# Patient Record
Sex: FEMALE | Race: WHITE | NOT HISPANIC OR LATINO | Employment: FULL TIME | URBAN - METROPOLITAN AREA
[De-identification: names, ages, dates, MRNs, and addresses within clinical notes are randomized per-mention and may not be internally consistent; named-entity substitution may affect disease eponyms.]

---

## 2023-03-10 PROBLEM — E55.9 VITAMIN D DEFICIENCY DISEASE: Status: ACTIVE | Noted: 2023-03-10

## 2023-03-10 PROBLEM — M25.552 HIP PAIN, ACUTE, LEFT: Status: ACTIVE | Noted: 2023-03-10

## 2023-03-10 PROBLEM — M77.51 TENDINITIS OF RIGHT FOOT: Status: ACTIVE | Noted: 2023-03-10

## 2023-03-10 PROBLEM — M46.1 SACROILIITIS (CMS-HCC): Status: ACTIVE | Noted: 2023-03-10

## 2023-03-10 PROBLEM — R73.09 ELEVATED GLUCOSE: Status: ACTIVE | Noted: 2023-03-10

## 2023-03-10 PROBLEM — M54.50 ACUTE LEFT-SIDED LOW BACK PAIN WITHOUT SCIATICA: Status: ACTIVE | Noted: 2023-03-10

## 2023-03-10 PROBLEM — F41.8 ANXIETY ASSOCIATED WITH DEPRESSION: Status: ACTIVE | Noted: 2023-03-10

## 2023-03-10 PROBLEM — E66.01 MORBID OBESITY (MULTI): Status: ACTIVE | Noted: 2023-03-10

## 2023-03-10 PROBLEM — J45.41 MODERATE PERSISTENT ASTHMA WITH ACUTE EXACERBATION (HHS-HCC): Status: ACTIVE | Noted: 2023-03-10

## 2023-03-10 PROBLEM — G43.909 MIGRAINE HEADACHE: Status: ACTIVE | Noted: 2023-03-10

## 2023-03-10 PROBLEM — H69.93 DYSFUNCTION OF BOTH EUSTACHIAN TUBES: Status: ACTIVE | Noted: 2023-03-10

## 2023-03-10 PROBLEM — D64.9 ANEMIA: Status: ACTIVE | Noted: 2023-03-10

## 2023-03-10 PROBLEM — G47.00 INSOMNIA: Status: ACTIVE | Noted: 2023-03-10

## 2023-03-10 PROBLEM — M94.0 COSTOCHONDRITIS: Status: ACTIVE | Noted: 2023-03-10

## 2023-03-10 PROBLEM — J45.909 ALLERGIC ASTHMA (HHS-HCC): Status: ACTIVE | Noted: 2023-03-10

## 2023-03-10 PROBLEM — G56.01 CARPAL TUNNEL SYNDROME OF RIGHT WRIST: Status: ACTIVE | Noted: 2023-03-10

## 2023-03-10 PROBLEM — M77.8 TENDINITIS OF LEFT HAND: Status: ACTIVE | Noted: 2023-03-10

## 2023-03-10 PROBLEM — K21.9 GERD (GASTROESOPHAGEAL REFLUX DISEASE): Status: ACTIVE | Noted: 2023-03-10

## 2023-03-10 PROBLEM — R73.03 PREDIABETES: Status: ACTIVE | Noted: 2023-03-10

## 2023-03-10 RX ORDER — BUPROPION HYDROCHLORIDE 300 MG/1
1 TABLET ORAL DAILY
COMMUNITY
Start: 2022-02-18 | End: 2023-03-17 | Stop reason: SDUPTHER

## 2023-03-10 RX ORDER — FLUTICASONE PROPIONATE 50 MCG
2 SPRAY, SUSPENSION (ML) NASAL DAILY
COMMUNITY
End: 2023-03-17 | Stop reason: ALTCHOICE

## 2023-03-10 RX ORDER — UBIDECARENONE 75 MG
500 CAPSULE ORAL DAILY
COMMUNITY
End: 2023-03-17 | Stop reason: ALTCHOICE

## 2023-03-10 RX ORDER — TRAZODONE HYDROCHLORIDE 50 MG/1
50 TABLET ORAL NIGHTLY PRN
Qty: 14 TABLET | Refills: 0 | OUTPATIENT
Start: 2023-03-10 | End: 2023-03-24

## 2023-03-10 RX ORDER — ALBUTEROL SULFATE 90 UG/1
2 AEROSOL, METERED RESPIRATORY (INHALATION) EVERY 4 HOURS PRN
COMMUNITY
End: 2023-12-27

## 2023-03-10 RX ORDER — CITALOPRAM 10 MG/1
30 TABLET ORAL DAILY
COMMUNITY
Start: 2017-03-27 | End: 2023-03-17 | Stop reason: ALTCHOICE

## 2023-03-10 RX ORDER — TRIAMCINOLONE ACETONIDE 1 MG/G
1 CREAM TOPICAL 2 TIMES DAILY
COMMUNITY
Start: 2022-02-18

## 2023-03-10 RX ORDER — CELECOXIB 200 MG/1
200 CAPSULE ORAL 2 TIMES DAILY
COMMUNITY
Start: 2022-02-18 | End: 2023-03-17 | Stop reason: SDUPTHER

## 2023-03-10 RX ORDER — FLUTICASONE FUROATE AND VILANTEROL 100; 25 UG/1; UG/1
1 POWDER RESPIRATORY (INHALATION) DAILY
COMMUNITY
End: 2023-03-17 | Stop reason: ALTCHOICE

## 2023-03-10 RX ORDER — TRAZODONE HYDROCHLORIDE 50 MG/1
1 TABLET ORAL NIGHTLY PRN
COMMUNITY
Start: 2022-08-26 | End: 2023-03-13 | Stop reason: SDUPTHER

## 2023-03-10 RX ORDER — OMEPRAZOLE 20 MG/1
1 CAPSULE, DELAYED RELEASE ORAL DAILY
COMMUNITY
Start: 2020-06-12 | End: 2023-03-17 | Stop reason: SDUPTHER

## 2023-03-10 RX ORDER — MIRTAZAPINE 30 MG/1
1 TABLET, FILM COATED ORAL NIGHTLY
COMMUNITY
End: 2023-03-17 | Stop reason: ALTCHOICE

## 2023-03-13 DIAGNOSIS — G47.09 OTHER INSOMNIA: Primary | ICD-10-CM

## 2023-03-13 RX ORDER — TRAZODONE HYDROCHLORIDE 50 MG/1
50 TABLET ORAL NIGHTLY PRN
Qty: 30 TABLET | Refills: 0 | Status: SHIPPED | OUTPATIENT
Start: 2023-03-13 | End: 2023-03-17 | Stop reason: SDUPTHER

## 2023-03-17 ENCOUNTER — OFFICE VISIT (OUTPATIENT)
Dept: PRIMARY CARE | Facility: CLINIC | Age: 53
End: 2023-03-17
Payer: COMMERCIAL

## 2023-03-17 VITALS
SYSTOLIC BLOOD PRESSURE: 124 MMHG | DIASTOLIC BLOOD PRESSURE: 84 MMHG | HEIGHT: 68 IN | HEART RATE: 85 BPM | OXYGEN SATURATION: 98 % | WEIGHT: 268 LBS | BODY MASS INDEX: 40.62 KG/M2 | TEMPERATURE: 97.3 F

## 2023-03-17 DIAGNOSIS — Z12.31 SCREENING MAMMOGRAM, ENCOUNTER FOR: Primary | ICD-10-CM

## 2023-03-17 DIAGNOSIS — D50.9 IRON DEFICIENCY ANEMIA, UNSPECIFIED IRON DEFICIENCY ANEMIA TYPE: ICD-10-CM

## 2023-03-17 DIAGNOSIS — F41.8 ANXIETY ASSOCIATED WITH DEPRESSION: ICD-10-CM

## 2023-03-17 DIAGNOSIS — E55.9 VITAMIN D DEFICIENCY: ICD-10-CM

## 2023-03-17 DIAGNOSIS — Z00.00 HEALTHCARE MAINTENANCE: ICD-10-CM

## 2023-03-17 DIAGNOSIS — G47.09 OTHER INSOMNIA: ICD-10-CM

## 2023-03-17 DIAGNOSIS — M17.10 PRIMARY OSTEOARTHRITIS OF KNEE, UNSPECIFIED LATERALITY: ICD-10-CM

## 2023-03-17 DIAGNOSIS — K21.9 GASTROESOPHAGEAL REFLUX DISEASE WITHOUT ESOPHAGITIS: ICD-10-CM

## 2023-03-17 PROBLEM — E66.813 CLASS 3 DRUG-INDUCED OBESITY WITH BODY MASS INDEX (BMI) OF 40.0 TO 44.9 IN ADULT: Status: ACTIVE | Noted: 2023-03-17

## 2023-03-17 PROBLEM — E66.1 CLASS 3 DRUG-INDUCED OBESITY WITH BODY MASS INDEX (BMI) OF 40.0 TO 44.9 IN ADULT (MULTI): Status: ACTIVE | Noted: 2023-03-17

## 2023-03-17 PROCEDURE — 99214 OFFICE O/P EST MOD 30 MIN: CPT | Performed by: FAMILY MEDICINE

## 2023-03-17 PROCEDURE — 1036F TOBACCO NON-USER: CPT | Performed by: FAMILY MEDICINE

## 2023-03-17 RX ORDER — ESCITALOPRAM OXALATE 10 MG/1
10 TABLET ORAL DAILY
Qty: 30 TABLET | Refills: 5 | Status: SHIPPED | OUTPATIENT
Start: 2023-03-17 | End: 2023-09-21 | Stop reason: SDUPTHER

## 2023-03-17 RX ORDER — TRAZODONE HYDROCHLORIDE 50 MG/1
50 TABLET ORAL NIGHTLY PRN
Qty: 90 TABLET | Refills: 3 | Status: SHIPPED | OUTPATIENT
Start: 2023-03-17 | End: 2023-11-24 | Stop reason: DRUGHIGH

## 2023-03-17 RX ORDER — OMEPRAZOLE 20 MG/1
20 CAPSULE, DELAYED RELEASE ORAL DAILY
Qty: 90 CAPSULE | Refills: 3 | Status: SHIPPED | OUTPATIENT
Start: 2023-03-17 | End: 2023-11-24 | Stop reason: SDUPTHER

## 2023-03-17 RX ORDER — CELECOXIB 200 MG/1
200 CAPSULE ORAL 2 TIMES DAILY
Qty: 60 CAPSULE | Refills: 5 | Status: SHIPPED | OUTPATIENT
Start: 2023-03-17 | End: 2023-09-13

## 2023-03-17 RX ORDER — BUPROPION HYDROCHLORIDE 300 MG/1
300 TABLET ORAL DAILY
Qty: 90 TABLET | Refills: 3 | Status: SHIPPED | OUTPATIENT
Start: 2023-03-17 | End: 2023-09-21

## 2023-03-17 ASSESSMENT — PATIENT HEALTH QUESTIONNAIRE - PHQ9
4. FEELING TIRED OR HAVING LITTLE ENERGY: SEVERAL DAYS
SUM OF ALL RESPONSES TO PHQ QUESTIONS 1-9: 3
6. FEELING BAD ABOUT YOURSELF - OR THAT YOU ARE A FAILURE OR HAVE LET YOURSELF OR YOUR FAMILY DOWN: SEVERAL DAYS
2. FEELING DOWN, DEPRESSED OR HOPELESS: SEVERAL DAYS
10. IF YOU CHECKED OFF ANY PROBLEMS, HOW DIFFICULT HAVE THESE PROBLEMS MADE IT FOR YOU TO DO YOUR WORK, TAKE CARE OF THINGS AT HOME, OR GET ALONG WITH OTHER PEOPLE: SOMEWHAT DIFFICULT
SUM OF ALL RESPONSES TO PHQ9 QUESTIONS 1 AND 2: 1
1. LITTLE INTEREST OR PLEASURE IN DOING THINGS: NOT AT ALL
5. POOR APPETITE OR OVEREATING: NOT AT ALL
8. MOVING OR SPEAKING SO SLOWLY THAT OTHER PEOPLE COULD HAVE NOTICED. OR THE OPPOSITE, BEING SO FIGETY OR RESTLESS THAT YOU HAVE BEEN MOVING AROUND A LOT MORE THAN USUAL: NOT AT ALL
7. TROUBLE CONCENTRATING ON THINGS, SUCH AS READING THE NEWSPAPER OR WATCHING TELEVISION: NOT AT ALL
3. TROUBLE FALLING OR STAYING ASLEEP OR SLEEPING TOO MUCH: NOT AT ALL
9. THOUGHTS THAT YOU WOULD BE BETTER OFF DEAD, OR OF HURTING YOURSELF: NOT AT ALL

## 2023-03-17 ASSESSMENT — ANXIETY QUESTIONNAIRES
3. WORRYING TOO MUCH ABOUT DIFFERENT THINGS: SEVERAL DAYS
4. TROUBLE RELAXING: NOT AT ALL
IF YOU CHECKED OFF ANY PROBLEMS ON THIS QUESTIONNAIRE, HOW DIFFICULT HAVE THESE PROBLEMS MADE IT FOR YOU TO DO YOUR WORK, TAKE CARE OF THINGS AT HOME, OR GET ALONG WITH OTHER PEOPLE: SOMEWHAT DIFFICULT
6. BECOMING EASILY ANNOYED OR IRRITABLE: MORE THAN HALF THE DAYS
7. FEELING AFRAID AS IF SOMETHING AWFUL MIGHT HAPPEN: NOT AT ALL
1. FEELING NERVOUS, ANXIOUS, OR ON EDGE: NOT AT ALL
GAD7 TOTAL SCORE: 4
2. NOT BEING ABLE TO STOP OR CONTROL WORRYING: SEVERAL DAYS
5. BEING SO RESTLESS THAT IT IS HARD TO SIT STILL: NOT AT ALL

## 2023-03-17 NOTE — PATIENT INSTRUCTIONS
Advised to Increase physical activity  Await Ortho consultation for surgical and PT options.   Follow-up  for CPE after labs drawn.

## 2023-03-17 NOTE — PROGRESS NOTES
Subjective   Patient ID: Charo Linder is a 52 y.o. female who presents for Med Refill and Knee Pain (Left Knee Pain).  HPI    MOOD SWINGS: more irritable intermittently  More the last few months despite taking Bupropion regularly    KNEE PAIN: Dr. Daugherty for 2nd opinion on whether revision is needed.  [facility] Dr. Juarez initially evaluated, previous ortho had retired.  Started PT and was feeling better but at follow up appointment ortho felt surgery was indicated.    GERD: trying to wean off Omeprazole   Dr. Kirkpatrick, allergist, felt it might be possible since she had lost weight and symptoms had improved.    Review of Systems  Review of Systems negative except as noted in HPI and Chief complaint.     Objective               Physical Exam  Constitutional:       General: She is not in acute distress.     Appearance: Normal appearance. She is not ill-appearing.   HENT:      Head: Normocephalic and atraumatic.   Neck:      Vascular: No carotid bruit.   Cardiovascular:      Rate and Rhythm: Normal rate and regular rhythm.      Pulses: Normal pulses.      Heart sounds: Normal heart sounds. No murmur heard.     No gallop.   Pulmonary:      Effort: Pulmonary effort is normal.      Breath sounds: Normal breath sounds. No wheezing, rhonchi or rales.   Musculoskeletal:      Cervical back: Normal range of motion and neck supple. No rigidity or tenderness.   Lymphadenopathy:      Cervical: No cervical adenopathy.   Skin:     General: Skin is warm and dry.   Neurological:      Mental Status: She is alert.   Psychiatric:         Mood and Affect: Mood normal.         Behavior: Behavior normal.       /84 (BP Location: Left arm, Patient Position: Sitting, BP Cuff Size: Adult)   Pulse 85   Temp 36.3 °C (97.3 °F)   Wt 122 kg (268 lb)   SpO2 98%   BMI 40.75 kg/m²      Assessment/Plan   Problem List Items Addressed This Visit          Nervous    Insomnia    Relevant Medications    traZODone (Desyrel) 50 mg tablet        Digestive    GERD (gastroesophageal reflux disease)    Relevant Medications    omeprazole (PriLOSEC) 20 mg DR capsule       Hematologic    Anemia     Repeat CBC - consider addition of iron supplements.            Other    Anxiety associated with depression    Relevant Medications    buPROPion XL (Wellbutrin XL) 300 mg 24 hr tablet    escitalopram (Lexapro) 10 mg tablet     Other Visit Diagnoses       Screening mammogram, encounter for    -  Primary    Relevant Orders    BI mammo bilateral screening tomosynthesis    Healthcare maintenance        Relevant Orders    CBC    Comprehensive Metabolic Panel    Lipid Panel    TSH with reflex to Free T4 if abnormal    Follow Up In [facility] - PCP    Vitamin D deficiency        Relevant Orders    Vitamin D, Total    Primary osteoarthritis of knee, unspecified laterality        Relevant Medications    celecoxib (CeleBREX) 200 mg capsule

## 2023-03-17 NOTE — ASSESSMENT & PLAN NOTE
Encouraging diet and exercise efforts  Reviewed ideal BMI of 25 - goal currently is 175 pounds whic would bring her to 26.6 BMI.

## 2023-03-21 LAB
C REACTIVE PROTEIN (MG/L) IN SER/PLAS: 0.53 MG/DL
SEDIMENTATION RATE, ERYTHROCYTE: 20 MM/H (ref 0–20)

## 2023-04-12 ENCOUNTER — LAB (OUTPATIENT)
Dept: LAB | Facility: LAB | Age: 53
End: 2023-04-12
Payer: COMMERCIAL

## 2023-04-12 DIAGNOSIS — Z00.00 HEALTHCARE MAINTENANCE: ICD-10-CM

## 2023-04-12 DIAGNOSIS — E55.9 VITAMIN D DEFICIENCY: ICD-10-CM

## 2023-04-12 LAB
ALANINE AMINOTRANSFERASE (SGPT) (U/L) IN SER/PLAS: 19 U/L (ref 7–45)
ALBUMIN (G/DL) IN SER/PLAS: 4.1 G/DL (ref 3.4–5)
ALKALINE PHOSPHATASE (U/L) IN SER/PLAS: 71 U/L (ref 33–110)
ANION GAP IN SER/PLAS: 10 MMOL/L (ref 10–20)
ASPARTATE AMINOTRANSFERASE (SGOT) (U/L) IN SER/PLAS: 21 U/L (ref 9–39)
BILIRUBIN TOTAL (MG/DL) IN SER/PLAS: 1 MG/DL (ref 0–1.2)
CALCIDIOL (25 OH VITAMIN D3) (NG/ML) IN SER/PLAS: 37 NG/ML
CALCIUM (MG/DL) IN SER/PLAS: 9.7 MG/DL (ref 8.6–10.6)
CARBON DIOXIDE, TOTAL (MMOL/L) IN SER/PLAS: 28 MMOL/L (ref 21–32)
CHLORIDE (MMOL/L) IN SER/PLAS: 106 MMOL/L (ref 98–107)
CHOLESTEROL (MG/DL) IN SER/PLAS: 181 MG/DL (ref 0–199)
CHOLESTEROL IN HDL (MG/DL) IN SER/PLAS: 69.9 MG/DL
CHOLESTEROL/HDL RATIO: 2.6
CREATININE (MG/DL) IN SER/PLAS: 0.69 MG/DL (ref 0.5–1.05)
ERYTHROCYTE DISTRIBUTION WIDTH (RATIO) BY AUTOMATED COUNT: 17.6 % (ref 11.5–14.5)
ERYTHROCYTE MEAN CORPUSCULAR HEMOGLOBIN CONCENTRATION (G/DL) BY AUTOMATED: 29.3 G/DL (ref 32–36)
ERYTHROCYTE MEAN CORPUSCULAR VOLUME (FL) BY AUTOMATED COUNT: 73 FL (ref 80–100)
ERYTHROCYTES (10*6/UL) IN BLOOD BY AUTOMATED COUNT: 5.13 X10E12/L (ref 4–5.2)
GFR FEMALE: >90 ML/MIN/1.73M2
GLUCOSE (MG/DL) IN SER/PLAS: 94 MG/DL (ref 74–99)
HEMATOCRIT (%) IN BLOOD BY AUTOMATED COUNT: 37.6 % (ref 36–46)
HEMOGLOBIN (G/DL) IN BLOOD: 11 G/DL (ref 12–16)
LDL: 98 MG/DL (ref 0–99)
LEUKOCYTES (10*3/UL) IN BLOOD BY AUTOMATED COUNT: 5.2 X10E9/L (ref 4.4–11.3)
NRBC (PER 100 WBCS) BY AUTOMATED COUNT: 0 /100 WBC (ref 0–0)
PLATELETS (10*3/UL) IN BLOOD AUTOMATED COUNT: 268 X10E9/L (ref 150–450)
POTASSIUM (MMOL/L) IN SER/PLAS: 5.1 MMOL/L (ref 3.5–5.3)
PROTEIN TOTAL: 6.8 G/DL (ref 6.4–8.2)
SODIUM (MMOL/L) IN SER/PLAS: 139 MMOL/L (ref 136–145)
THYROTROPIN (MIU/L) IN SER/PLAS BY DETECTION LIMIT <= 0.05 MIU/L: 1.26 MIU/L (ref 0.44–3.98)
TRIGLYCERIDE (MG/DL) IN SER/PLAS: 68 MG/DL (ref 0–149)
UREA NITROGEN (MG/DL) IN SER/PLAS: 21 MG/DL (ref 6–23)
VLDL: 14 MG/DL (ref 0–40)

## 2023-04-12 PROCEDURE — 82306 VITAMIN D 25 HYDROXY: CPT

## 2023-04-12 PROCEDURE — 85027 COMPLETE CBC AUTOMATED: CPT

## 2023-04-12 PROCEDURE — 80053 COMPREHEN METABOLIC PANEL: CPT

## 2023-04-12 PROCEDURE — 36415 COLL VENOUS BLD VENIPUNCTURE: CPT

## 2023-04-12 PROCEDURE — 84443 ASSAY THYROID STIM HORMONE: CPT

## 2023-04-12 PROCEDURE — 80061 LIPID PANEL: CPT

## 2023-05-02 LAB — SARS-COV-2 RESULT: NOT DETECTED

## 2023-05-03 ENCOUNTER — HOSPITAL ENCOUNTER (INPATIENT)
Dept: DATA CONVERSION | Facility: HOSPITAL | Age: 53
Discharge: HOME | End: 2023-05-05
Attending: ORTHOPAEDIC SURGERY | Admitting: ORTHOPAEDIC SURGERY
Payer: COMMERCIAL

## 2023-05-03 DIAGNOSIS — R73.9 HYPERGLYCEMIA, UNSPECIFIED: ICD-10-CM

## 2023-05-03 DIAGNOSIS — Z79.82 LONG TERM (CURRENT) USE OF ASPIRIN: ICD-10-CM

## 2023-05-03 DIAGNOSIS — K21.9 GASTRO-ESOPHAGEAL REFLUX DISEASE WITHOUT ESOPHAGITIS: ICD-10-CM

## 2023-05-03 DIAGNOSIS — F41.9 ANXIETY DISORDER, UNSPECIFIED: ICD-10-CM

## 2023-05-03 DIAGNOSIS — E66.01 MORBID (SEVERE) OBESITY DUE TO EXCESS CALORIES (MULTI): ICD-10-CM

## 2023-05-03 DIAGNOSIS — I34.1 NONRHEUMATIC MITRAL (VALVE) PROLAPSE: ICD-10-CM

## 2023-05-03 DIAGNOSIS — R51.9 HEADACHE, UNSPECIFIED: ICD-10-CM

## 2023-05-03 DIAGNOSIS — I34.0 NONRHEUMATIC MITRAL (VALVE) INSUFFICIENCY: ICD-10-CM

## 2023-05-03 DIAGNOSIS — M25.762 OSTEOPHYTE, LEFT KNEE: ICD-10-CM

## 2023-05-03 DIAGNOSIS — J45.909 UNSPECIFIED ASTHMA, UNCOMPLICATED (HHS-HCC): ICD-10-CM

## 2023-05-03 DIAGNOSIS — T84.023A: ICD-10-CM

## 2023-05-03 DIAGNOSIS — M19.90 UNSPECIFIED OSTEOARTHRITIS, UNSPECIFIED SITE: ICD-10-CM

## 2023-05-03 DIAGNOSIS — G47.00 INSOMNIA, UNSPECIFIED: ICD-10-CM

## 2023-05-03 DIAGNOSIS — T84.033A MECHANICAL LOOSENING OF INTERNAL LEFT KNEE PROSTHETIC JOINT, INITIAL ENCOUNTER (CMS-HCC): ICD-10-CM

## 2023-05-03 DIAGNOSIS — T84.018A: ICD-10-CM

## 2023-05-03 DIAGNOSIS — F32.A DEPRESSION, UNSPECIFIED: ICD-10-CM

## 2023-05-03 LAB
ABO GROUP (TYPE) IN BLOOD: NORMAL
ANTIBODY SCREEN: NORMAL
RH FACTOR: NORMAL

## 2023-05-04 LAB
ANION GAP IN SER/PLAS: 11 MMOL/L (ref 10–20)
CALCIUM (MG/DL) IN SER/PLAS: 8.5 MG/DL (ref 8.6–10.3)
CARBON DIOXIDE, TOTAL (MMOL/L) IN SER/PLAS: 25 MMOL/L (ref 21–32)
CHLORIDE (MMOL/L) IN SER/PLAS: 105 MMOL/L (ref 98–107)
CREATININE (MG/DL) IN SER/PLAS: 0.76 MG/DL (ref 0.5–1.05)
ERYTHROCYTE DISTRIBUTION WIDTH (RATIO) BY AUTOMATED COUNT: 17.2 % (ref 11.5–14.5)
ERYTHROCYTE MEAN CORPUSCULAR HEMOGLOBIN CONCENTRATION (G/DL) BY AUTOMATED: 30.1 G/DL (ref 32–36)
ERYTHROCYTE MEAN CORPUSCULAR VOLUME (FL) BY AUTOMATED COUNT: 72 FL (ref 80–100)
ERYTHROCYTES (10*6/UL) IN BLOOD BY AUTOMATED COUNT: 4.22 X10E12/L (ref 4–5.2)
GFR FEMALE: >90 ML/MIN/1.73M2
GLUCOSE (MG/DL) IN SER/PLAS: 176 MG/DL (ref 74–99)
HEMATOCRIT (%) IN BLOOD BY AUTOMATED COUNT: 30.2 % (ref 36–46)
HEMOGLOBIN (G/DL) IN BLOOD: 9.1 G/DL (ref 12–16)
LEUKOCYTES (10*3/UL) IN BLOOD BY AUTOMATED COUNT: 10.5 X10E9/L (ref 4.4–11.3)
PLATELETS (10*3/UL) IN BLOOD AUTOMATED COUNT: 229 X10E9/L (ref 150–450)
POTASSIUM (MMOL/L) IN SER/PLAS: 4.2 MMOL/L (ref 3.5–5.3)
SODIUM (MMOL/L) IN SER/PLAS: 137 MMOL/L (ref 136–145)
UREA NITROGEN (MG/DL) IN SER/PLAS: 18 MG/DL (ref 6–23)

## 2023-05-05 LAB
GRAM STAIN: NORMAL
TISSUE/WOUND CULTURE/SMEAR: NORMAL

## 2023-05-06 LAB
GRAM STAIN: NORMAL
GRAM STAIN: NORMAL
TISSUE/WOUND CULTURE/SMEAR: NORMAL
TISSUE/WOUND CULTURE/SMEAR: NORMAL

## 2023-05-08 LAB — HCG, URINE: NEGATIVE

## 2023-09-14 NOTE — DISCHARGE SUMMARY
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  LEFT KNEE REVISION ARTHROPLASTY OF BOTH COMPONENTS   Condition at Discharge: Satisfactory   Disposition at Discharge: .Home   Hospital Course:    ORTHOPEDIC SURGERY DISCHARGE SUMMARY    Attending Physician: Dr Isidro Daugherty   Reason for Hospitalization: Elective left knee revision total knee arthroplasty     Admitting Diagnosis: Loosening of left total knee arthroplasty  Discharge Diagnosis: Same as admitting     Operations During Hospitalization: revision of left total knee arthroplasty     Consultations: Physical Therapy, Case Management     Hospital Course:  This patient presented to admitting provider as an outpatient for knee pain secondary to loosening of total knee replacement.  surgery in the form of a revision knee replacement was recommended. The procedure, its risks, benefits, and potential complications  were discussed in detail with the patient prior to surgery. Understanding of all topics was conveyed by the patient, and consent was given for surgery. The patient was electively admitted for surgery. Surgery was scheduled and they underwent a knee replacement.   The procedure was tolerated well and they were sent to the post operative recovery room in stable condition, where they also did well. They were subsequently sent to their hospital room for postoperative management. Once on the floor their postoperative  course was unremarkable and they did well.  Their diet was  advanced and well tolerated. Their pain was well controlled on oral pain meds; this was eventually transitioned to oral medication alone prior to discharge.  They worked with Physical and Occupational  Therapy starting on POD# 0 who recommended they be discharged home. They remained afebrile with stable vital signs throughout her stay. Complete and comprehensive discharge instructions were provided to the patient as well as necessary prescriptions.  The patient had no further questions and was advised to call with any questions, concerns, or problems.     Patient was hemodynamically stable postoperatively. Discharge Antibiotics: Prior to surgery the patient was treated with antibiotics and continued with antibiotics 24 hours postoperatively     Transfers: PACU and then to the hospital surgical floor when PACU criteria was met.  Complications: Continued throughout the hospital course without complications.     PHYSICAL EXAM  GENERAL: A/Ox3, NAD. Appears healthy, well nourished  PSYCHIATRIC: Mood stable, appropriate memory recall  EYES: EOM intact, no scleral icterus  CARDIAC: regular rate  LUNGS: Breathing non-labored  SKIN: no erythema, rashes, or ecchymoses     MUSCULOSKELETAL:  Laterality:   Operative Knee Exam  - ROM, Extension: Full  - Dressing: clean and dry  - Negative homans  - Compartments soft  - Grossly NVI Distally on operative leg     NEUROVASCULAR:  - Neurovascular Status: sensation intact to light touch distally  - Capillary refill brisk at extremities, Bilateral dorsalis pedis pulse 2+      Immunizations:    Immunizations:  13-Jan-2014   Pneumonia- Pneumococcal polysaccharide vaccine: Immunizations,  13-Jan-2014      Discharge Information:    and Continuing Care:   Lab Results - Pending:    Culture, Miscellaneous, includes Gram Stain Drawn at 03-May-2023 13:28:00  Culture, Miscellaneous, includes Gram Stain Drawn at 03-May-2023 13:28:00  Culture, Miscellaneous, includes Gram Stain Drawn at 03-May-2023  13:28:00  Radiology Results - Pending: None   Discharge Instructions:    Additional Orders:           Additional Instructions:   Isidro DEMETRAAngela Daugherty DO  Phone: [phone] - [place], Medical Assistant    Postoperative Instructions: Revision total knee arthroplasty    WEIGHT BEARING: you are allowed 50% weight bearing on your operative leg    The following is a general guide and may be applied to most patients that go home from the hospital after surgery. If you go to a rehab facility the timeline may deviate a little. Please do not hesitate to call our office for any questions or additional  guidance. We will work with you to get the best experience from your new joint replacement.     WEEK 1  Relax?Don?t Overdo it!  - WEIGHT BEARING: As tolerated, unless otherwise specified  - Get up once an hour for small activities. (get a drink, go to the bathroom, etc.)  - Keep the surgical site iced and elevated above your heart, if possible, while you are resting.  - You will have some light exercises given to you from the physical therapist in the hospital. Work diligently on your range of motion.    DO NOT place a pillow under the knee for comfort  DO NOT sleep or rest in a recliner if you are able to get in your bed  DO NOT wait until you start therapy to bend the knee.  The sooner the better! (work within your pain tolerance).    All of this may sound scary but knees can get stiff easily and we want you to get your range of motion back as quickly as possible!  SWELLING AND BRUISING  BRUISING AND SWELLING AFTER SURGERY IS NORMAL. As the patient becomes more mobile the bruising and swelling will begin to migrate towards the ankle and foot and is usually noticed toward the end of the day.  WEEK 2-3  You will have a prescription for physical therapy given to you or electronically prescribed and you should start outpatient therapy 7-10 days after your operation. Be sure to do the home exercises on the days you are not attending  physical therapy.    - Continue to progress walking as tolerated.   - Continue to work on range of motion exercises at home with a goal of 0-120 degrees by 12 weeks post operative  - Continue to use ice for soreness on the surgical site  - You may wean from your walker to a cane when you feel safe and comfortable to do so. Your physical therapist will also be helpful with progressing your assistive device.   - Be careful with bending and twisting - If it hurts, stop!!    FOLLOW UP  - Your first post-operative visit with Dr. Isidro Daugherty should be scheduled for 2 WEEKS after surgery.  - You do not need new xrays for this visit  - Don?t be nervous! This visit is to see how you are doing and make sure your incision is healing nicely.       POSTOPERATIVE MEDICATIONS    PAIN MEDICATION    _______ Oxycodone  ? Oxycodone has been prescribed for post-operative pain control. Take one 5 mg tablet every 6 hours for pain. Alternate with Tramadol. See medication example sheet. This medication will only be refilled ONCE every 7 days for a period of 6 weeks.  After 6 weeks, you will transition to acetaminophen (Tylenol) and over -the- counter anti-inflammatories such as Ibuprofen, Advil or Aleve in conjunction with ICE.   ? Side effects may be constipation and nausea, vomiting, sleepiness, dizziness, lightheadedness, headache, blurred vision, dry mouth sweating, itching (if you have itching, over-the -counter Benadryl can be used as needed).  ? You may NOT operate a motor vehicle while taking this medication or have been cleared by your surgeon or PA.     ________ Tramadol  ? Tramadol has been prescribed for post-operative pain control. Take one 50 mg tablet every 6 hours for pain. Alternate with Oxycodone. See medication example sheet. This medication will only be refilled ONCE every 7 days for a period of 6  weeks. After 6 weeks, you will transition to acetaminophen (Tylenol) and over- the- counter anti-inflammatories such as  Ibuprofen, Advil or Aleve in conjunction with ICE.   ? Side effects may be constipation and nausea, vomiting, sleepiness, dizziness, lightheadedness, headache, blurred vision, dry mouth, sweating, itching (if you have itching, over-the -counter Benadryl can be used as needed).  ? You may NOT operate a motor vehicle while taking this medication or have been cleared by your surgeon or PA.     _________ Celecoxib (Celebrex) or Meloxicam (Mobic)  ? One of these will be prescribed (if you are able to take it) as an adjunct anti-inflammatory to assist in pain control. Take one twice daily for 4 weeks. See medication example sheet. You will not receive refills on this medication.  ? Side effects may include nausea or upset stomach    _________ Acetaminophen (Tylenol)  ? Acetaminophen has been prescribed as an adjunct for pain control. Take two 500 mg tablet every 6-8 hours for 4 weeks. See medication example sheet. No refills will be given after initial prescription.  ? Side effects may include nausea, heartburn, drowsiness, and headache.    _________Cyclobenzaprine (Flexeril)  ? This is a muscle relaxer that will be prescribed   ? Take this AS NEEDED per instructions on bottle  ? This will be only prescribed once and is available to help with muscle spasms. There will be no refills of this medication    BLOOD THINNER    __________ Aspirin or Other Blood Thinner  ? Aspirin or another medication has been prescribed as a blood thinner to prevent blood clots in your leg or lungs. Take as prescribed on the bottle for 4 weeks. You will not receive a refill on this medication.  ? Do not take this medication if you are on another blood thinner.    ANTI NAUSEA    __________ Pantoprazole  ? Pantoprazole has been prescribed to help with nausea and protect your stomach while taking pain medication. Take one 40 mg tablet once daily for 4 weeks. You will not receive a refill on this medication.    ANTIBIOTIC    ? If you are deemed  ?high risk? for infection after surgery and antibiotic will be prescribed. Please take this as directed for one week.     STOOL SOFTENERS    __________ Senna  ? Post-operative constipation can result due to a combination of inactivity, anesthesia and pain medication. To help prevent this, you should increase your water and fiber intake. Physical activity such as walking will also help stimulate the  bowels.   ? Senna has been prescribed to help with constipation while on Oxycodone and Tramadol. Take one tablet twice daily for 4 weeks. No refills will be given.  ? You may also take Miralax in combination with Senna to prevent constipation.  This can be purchased over the counter at your local pharmacy.  Take as instructed on the bottle.     Medication Refills - [phone]    If you need to request a medication refill, calls can be taken between Monday through Friday, 8am-1pm.  Any calls received outside of this timeframe will be handled on the next business day.  Medication requests received on Saturday or Sunday will be  handled on Monday.    Per State and Institutional policy, pain medications can only be refilled every 7 days for six weeks following surgery.    Prescription refills will be placed upon request, if there are any issues we will contact you accordingly.  We are unable to place follow up calls to confirm receipt of refill requests.  Please follow up with your pharmacy to verify that your refill has  been sent and is ready for .    ICE  ? You have been prescribed to ice your total joint at a minimum of twice per hour for 20 minutes while awake during the first 6 weeks after surgery if you are using ice packs. This will help with pain control.  ? If you are using an ice machine, please follow ice machine instructions.    WOUND CARE    ? You will have an ace wrap on your leg put on during surgery. This compression wrap is for comfort and may be removed at any time. You may continue to use  compression throughout your recovery if this is comfortable for you.  ? You have a waterproof bandage on your wound and may shower with this on. The incisional wound vac will stay on for seven days or remove sooner if the battery dies. You may remove it yourself. You may leave your incision open to air after  the bandage has been removed.      ? Under your waterproof bandage you have a mesh and glue dressing in place. Do not peel this off. This will be on for 3-4 weeks. You may trim the edges as they peel off on their own. You can continue to shower with this on. Let the water run  freely over your incision when showering and do not scrub.     ? DO NOT soak your incision in a bath, hot tub, pool or pond/lake for a minimum of 3 weeks following your surgery.    ? DO NOT use lotions, creams, ointments on your wound for a minimum of 3 weeks following your surgery. At that time you may use vitamin E to assist with softening of your incision.    DENTAL VISITS  It is recommended that you avoid any elective dental work (cleaning, whitening, etc) for 3 months after your surgery. In case of a dental emergency (cavity, root canal) within the 3 month postoperative period you should be pre-medicated with antibiotics.  Please call us before any dental work so we can provide you with antibiotic coverage.     After 3 months, most healthy individuals do not require antibiotics for dental visits.    ?High Risk? patients (chemotherapy, history of transplant, immunocompromised, rheumatoid arthritis) will need antibiotics prior to dental work.  If you think you may be in this category please call the office for assistance.    EMERGENCIES  When to contact our office immediately:  ? Fever >101.5 for at least 48 hours after surgery or chills.  ? Excessive bleeding from incision(s). A small amount of drainage is normal and expected.  ? Signs of infection of incision(s)-excessive drainage that is soaking through your dressing (especially if  it is pus-like), redness that is spreading out from the edges of your incision, or increased warmth around the area.  ? Excruciating pain for which the pain medication is not helping.  ? Severe calf pain.  ? Go directly to the emergency room or call 911, if you are experiencing chest pain or difficulty breathing.    RESTARTING HOME MEDICATIONS  ? You may restart your home medications the following day after your surgery UNLESS you have been given alternate instructions.    DIET  ? Resume your normal diet after surgery. If you are on a specific type of diet for your condition, resume that instead.      Total Joint Replacement Cold Therapy Recommendations    Cold therapy devices can be used before and after surgery to assist in comfort and help to reduce pain and swelling.  These devices differ from ice or ice packs whereas the mechanism circulates water through tubing and a pad to provide longer periods  of cold therapy to the desired site.  While in the hospital, you can use your cold devices around the clock for optimal comfort.  We recommend using cold therapy after working with therapy or completing exercises on your own.  Once you are discharged  home, there is no set schedule in which you must follow while using cold therapy.  Below are a few points to remember when using a cold therapy device:    ? Read the ?s instructions prior to first the use.  ? Follow instructions for filling the cooler (water first, then ice).  ? Always make sure there is a layer of protection between the cold pad and your skin  o Clothing, Towel, Ace Bandage, etc.  ? Allow the device to circulate cold water throughout the pad prior wrapping the pad (approximately 10 minutes).  ? Place the pad appropriately to accommodate your needs and use the wraps provided to secure the pad to your body.  ? During waking hours, remove the cold pad every 1-2 hours to perform a skin check  o Detach the pad from the cooler and ambulate at  least once every hour  ? After removing the pad, allow at least 30 minutes before resuming cold therapy  ? You may wear the cold therapy device during periods of sleep including overnight  o   If you wake up during the night, you can check the skin at this time.  You do not need to   wake up specifically to perform skin checks.  ? Empty the cooler and pad when device is not in use.  ? Follow ?s instructions for cleaning your cold therapy device.    OFFICE LOCATIONS    Location 1: [facility]  [address], [address]  Office Number: [phone]    Location 2: [facility]  [address]  [facility], [address]  Office Number: [phone]    Location 3: [facility]  [address] [place] [address]  Office Number: [phone]    Isidro Daugherty, DO  Joint Replacement and Adult Reconstructive Surgery  [facility]        Home Care Certification:           Home Care Agency:    Home Team [phone]          Skilled Disciplines Ordered:   PT,  OT    Home Care Services:           Home Care Skilled Service:   Rehab (PT/OT/SP eval and treat)    Discharge Medications: Home Medication   acetaminophen 500 mg oral tablet - 2 tab(s) orally every 6 to 8 hours  for pain   aspirin 81 mg oral tablet, chewable - 1 tab(s) chewed 2 times a day for four weeks to prevent the formation of blood clots  CeleBREX 100 mg oral capsule - 1 cap(s) orally 2 times a day for pain, inflammation, swelling  cyclobenzaprine 5 mg oral tablet - 1 tab(s) orally 3 times a day as needed for muscle spasms  oxyCODONE 5 mg oral capsule - 1 cap(s) orally every 6 hours as needed for severe pain  Protonix 40 mg oral delayed release tablet - 1 tab(s) orally once a day   senna 8.6 mg oral tablet - 1 tab(s) orally 2 times a day as needed to prevent constipation   traMADol 50 mg oral tablet - 1 tab(s) orally every 6 hours for severe pain  cefadroxil 500 mg oral capsule -  "1 cap(s) orally 2 times a day   Trelegy Ellipta 100 mcg-62.5 mcg-25 mcg/inh inhalation powder - 1 INH inhalation once a day  albuterol 90 mcg/inh inhalation aerosol - 1 INH inhalation prn  Airshield immune -    once a day  multivitamin Multiple Vitamins oral capsule - 1 cap(s) oral once a day  Wellbutrin  mg/24 hours oral tablet, extended release - 1 cap(s) oral once a day  Nasacort 55 mcg/inh nasal aerosol - 1 INH nasal once a day  Lexapro 10 mg oral tablet - 1 cap(s) oral once a day  traZODone 50 mg oral tablet - 1 cap(s) oral once a day at bedtime     PRN Medication     DNR Status:   ·  Code Status Code Status order at time of discharge: Full Code       Electronic Signatures:  Isidro Daugherty)  (Signed 05-May-2023 12:28)   Authored: Send Summary, Summary Content, Immunizations,  Ongoing Care, DNR Status, Note Completion      Last Updated: 05-May-2023 12:28 by Isidro Daugherty ()    References:  1.  Data Referenced From \"Consult-Medicine\" 03-May-2023 22:42   "

## 2023-09-14 NOTE — H&P
History & Physical Reviewed:   Pregnant/Lactating:  ·  Are You Pregnant no   ·  Are You Currently Breastfeeding no     I have reviewed the History and Physical dated:  25-Apr-2023   History and Physical reviewed and relevant findings noted. Patient examined to review pertinent physical  findings.: No significant changes   Home Medications Reviewed: no changes noted   Allergies Reviewed: no changes noted       ERAS (Enhanced Recovery After Surgery):  ·  ERAS Patient: yes   ·  CPM/PAT Utilization: yes   ·  Immunonutrition Recovery Drink Utilization: no   ·  Carbohydrate Supplement Drink Utilization: no     Consent:   COVID-19 Consent:  ·  COVID-19 Risk Consent Surgeon has reviewed key risks related to the risk of sharita COVID-19 and if they contract COVID-19 what the risks are.       Electronic Signatures:  Isidro Daugherty ()  (Signed 24-May-2023 07:41)   Authored: History & Physical Reviewed, ERAS, Consent,  Note Completion      Last Updated: 24-May-2023 07:41 by Isidro Daugherty ()

## 2023-09-14 NOTE — PROGRESS NOTES
Service: Orthopaedics     Subjective Data:   SHIVAM MONAE is a 53 year old Female who is Hospital Day # 3 and POD #2 for 1.  LEFT KNEE REVISION ARTHROPLASTY OF BOTH COMPONENTS;     She is doing well.  She is lying comfortably in bed.  She has worked with physical therapy.  She has no complaints at this time.    Overnight Events: Patient had an uneventful night.     Objective Data:     Objective Information:      T   P  R  BP   MAP  SpO2   Value  36.7  85  18  119/72      92%  Date/Time 5/5 4:53 5/5 4:53 5/5 4:53 5/5 4:53    5/5 4:53  Range  (36C - 36.7C )  (60 - 90 )  (16 - 18 )  (95 - 124 )/ (61 - 79 )    (90% - 97% )      Pain reported at 5/5 8:23: 10 = Severe    Physical Exam Narrative:  ·  Physical Exam:    Patient is lying comfortably in bed   Nonlabored breathing on room air   Distal extremities warm and well perfused  Surgical dressing clean dry intact   Lower extremity motor grossly intact   Sensation intact to light touch throughout distal extremities  Palpable distal pulses  Extremities wawrm and well perfused with brisk capillary refill.    Assessment and Plan:   Comorbidities:  ·  Comorbidity obesity   ·  Obesity morbid obesity (BMI 40+)   ·  BMI 41.68     Code Status:  ·  Code Status Full Code     Assessment:    Postop day 1 status post revision left total knee arthroplasty.      Patient is doing well and lying comfortably in bed.  She has worked with physical therapy and understands her postoperative precautions.      Incisional VAC is clean dry intact   Dressing clean dry and intact otherwise   Ice and elevate   Encourage full knee extension  Deep vein thrombosis prophylaxis per postoperative protocol   Dispo: Pending PT        Electronic Signatures:  Tam Ledezma)  (Signed 05-May-2023 09:06)   Authored: Service, Subjective Data, Objective Data, Assessment  and Plan, Note Completion      Last Updated: 05-May-2023 09:06 by Tam Ledezma)

## 2023-09-14 NOTE — PROGRESS NOTES
Subjective Data:   SHIVAM MONAE is a 53 year old Female who is Hospital Day # 2 and POD #1 for 1.  LEFT KNEE REVISION ARTHROPLASTY OF BOTH COMPONENTS;    Additional Information:    SHIVAM MONAE is a 53 year old Female with a past medical history of mitral valve prolapse asthma GERD anxiety morbid obesity seen postoperatively after patient  underwent revision of her left total knee.  Patient was complaining of some severe left knee pain and received some morphine.  She is denying any nausea vomiting shortness of breath chest pains palpitation numbness or tingling.  Current vital signs show  111/71 pulse of 81 respirations 14 patient has room air pulse ox of 98%    05/04: Patient was evaluated this a.m., knee pain is improving, no chest pain or shortness of breath, no nausea or vomiting.      Objective Data:     Objective Information:      T   P  R  BP   MAP  SpO2   Value  36.5  84  16  108/66      96%  Date/Time 5/4 4:44 5/4 8:42 5/4 4:44 5/4 8:42    5/4 4:44  Range  (36.1C - 36.6C )  (60 - 90 )  (14 - 18 )  (100 - 122 )/ (63 - 76 )    (92% - 98% )   As of 03-May-2023 20:27:00, patient is on 2 L/min of oxygen via room air.      Pain reported at 5/4 8:40: 10 = Severe    Physical Exam by System:    Constitutional: Well developed, awake/alert/oriented  x3, no distress, alert and cooperative   Eyes: PERRL, EOMI, clear sclera   ENMT: mucous membranes moist, no apparent injury,  no lesions seen   Head/Neck: Neck supple, no apparent injury, thyroid  without mass or tenderness, No JVD, trachea midline, no bruits   Respiratory/Thorax: Patent airways, CTAB, normal  breath sounds with good chest expansion, thorax symmetric   Cardiovascular: Regular patient has grade 2 systolic  murmur no gallop no peripheral edema   Gastrointestinal: Nondistended, soft, non-tender,  no rebound tenderness or guarding, no masses palpable, no organomegaly, +BS, no bruits   Genitourinary: No flank tenderness   Musculoskeletal: ROM  intact, no joint swelling, normal  strength  Left knee wrapped   Extremities: normal extremities, no cyanosis edema,  contusions or wounds, no clubbing   Neurological: alert and oriented x3, intact senses,  motor, response and reflexes, normal strength   Lymphatic: No significant lymphadenopathy   Psychological: Appropriate mood and behavior   Skin: Warm and dry, no lesions, no rashes     Recent Lab Results:    Results:    CBC: 5/4/2023 05:39              \     Hgb     /                              \     9.1 L    /  WBC  ----------------  Plt               10.5       ----------------    229              /     Hct     \                              /     30.2 L    \            RBC: 4.22     MCV: 72 L          BMP: 5/4/2023 05:39  NA+        Cl-     BUN  /                         137    105    18  /  --------------------------------  Glucose                ---------------------------  176 H    K+     HCO3-   Creat \                         4.2  25    0.76  \  Calcium : 8.5 L    Anion Gap : 11      Assessment and Plan:   Code Status:  ·  Code Status Full Code       Impression 1: Status post left total knee revision   Plan for Impression 1: Management as per primary  service   Impression 2: Mitral valve prolapse   Plan for Impression 2: Patient well compensated at  present time continue blood pressure control   Impression 3: Reactive airway disease   Plan for Impression 3: Resume home as needed albuterol   Impression 4: Hyperglycemia   Plan for Impression 4: Patient is aware of elevated  blood sugar and working on lifestyle modification.  Patient denies having diagnosis of diabetes and unwilling to check A1c here.  She will follow-up with the primary care provider as an outpatient   Impression 5: GERD   Plan for Impression 5: On PPI       Electronic Signatures:  Usman Myers)  (Signed 04-May-2023 12:31)   Authored: Subjective Data, Objective Data, Assessment  and Plan, Note Completion      Last Updated:  04-May-2023 12:31 by Usman Myers)

## 2023-09-14 NOTE — PROGRESS NOTES
Subjective Data:   SHIVAM MONAE is a 53 year old Female who is Hospital Day # 3 and POD #2 for 1.  LEFT KNEE REVISION ARTHROPLASTY OF BOTH COMPONENTS;    Additional Information:    SHIAVM MONAE is a 53 year old Female with a past medical history of mitral valve prolapse asthma GERD anxiety morbid obesity seen postoperatively after patient  underwent revision of her left total knee.  Patient was complaining of some severe left knee pain and received some morphine.  She is denying any nausea vomiting shortness of breath chest pains palpitation numbness or tingling.  Current vital signs show  111/71 pulse of 81 respirations 14 patient has room air pulse ox of 98%    05/04: Patient was evaluated this a.m., knee pain is improving, no chest pain or shortness of breath, no nausea or vomiting.  05/05: Patient denies active complaint, working with physical therapy, knee pain is improving, no fever or chills, no nausea or vomiting.  Hoping for discharge today.      Objective Data:     Objective Information:      T   P  R  BP   MAP  SpO2   Value  36.7  85  18  119/72      92%  Date/Time 5/5 4:53 5/5 4:53 5/5 4:53 5/5 4:53    5/5 4:53  Range  (36C - 36.7C )  (60 - 90 )  (16 - 18 )  (95 - 124 )/ (61 - 79 )    (90% - 97% )      Pain reported at 5/5 9:54: 7 = Severe    Physical Exam by System:    Constitutional: Well developed, awake/alert/oriented  x3, no distress, alert and cooperative   Eyes: PERRL, EOMI, clear sclera   ENMT: mucous membranes moist, no apparent injury,  no lesions seen   Head/Neck: Neck supple, no apparent injury, thyroid  without mass or tenderness, No JVD, trachea midline, no bruits   Respiratory/Thorax: Patent airways, CTAB, normal  breath sounds with good chest expansion, thorax symmetric   Cardiovascular: Regular patient has grade 2 systolic  murmur no gallop no peripheral edema   Gastrointestinal: Nondistended, soft, non-tender,  no rebound tenderness or guarding, no masses palpable, no  organomegaly, +BS, no bruits   Genitourinary: No flank tenderness   Musculoskeletal: ROM intact, no joint swelling, normal  strength  Left knee wrapped   Extremities: normal extremities, no cyanosis edema,  contusions or wounds, no clubbing   Neurological: alert and oriented x3, intact senses,  motor, response and reflexes, normal strength   Lymphatic: No significant lymphadenopathy   Psychological: Appropriate mood and behavior   Skin: Warm and dry, no lesions, no rashes     Recent Lab Results:    Results:    CBC: 5/4/2023 05:39              \     Hgb     /                              \     9.1 L    /  WBC  ----------------  Plt               10.5       ----------------    229              /     Hct     \                              /     30.2 L    \            RBC: 4.22     MCV: 72 L          BMP: 5/4/2023 05:39  NA+        Cl-     BUN  /                         137    105    18  /  --------------------------------  Glucose                ---------------------------  176 H    K+     HCO3-   Creat \                         4.2  25    0.76  \  Calcium : 8.5 L    Anion Gap : 11      Assessment and Plan:   Code Status:  ·  Code Status Full Code       Impression 1: Status post left total knee revision   Plan for Impression 1: Management as per primary  service   Impression 2: Mitral valve prolapse   Plan for Impression 2: Patient well compensated at  present time continue blood pressure control   Impression 3: Reactive airway disease   Plan for Impression 3: Resume home as needed albuterol   Impression 4: Hyperglycemia   Plan for Impression 4: Patient is aware of elevated  blood sugar and working on lifestyle modification.  Patient denies having diagnosis of diabetes and unwilling to check A1c here.  She will follow-up with the primary care provider as an outpatient   Impression 5: GERD   Plan for Impression 5: On PPI       Electronic Signatures:  Usman Myers)  (Signed 05-May-2023 12:23)   Authored:  Subjective Data, Objective Data, Assessment  and Plan, Note Completion      Last Updated: 05-May-2023 12:23 by Usman Myers)

## 2023-09-17 DIAGNOSIS — G47.09 OTHER INSOMNIA: ICD-10-CM

## 2023-09-18 DIAGNOSIS — F41.8 ANXIETY ASSOCIATED WITH DEPRESSION: ICD-10-CM

## 2023-09-21 RX ORDER — BUPROPION HYDROCHLORIDE 300 MG/1
300 TABLET ORAL DAILY
Qty: 30 TABLET | Refills: 0 | Status: SHIPPED | OUTPATIENT
Start: 2023-09-21 | End: 2023-11-24 | Stop reason: SDUPTHER

## 2023-09-21 RX ORDER — ESCITALOPRAM OXALATE 10 MG/1
10 TABLET ORAL DAILY
Qty: 90 TABLET | Refills: 1 | Status: SHIPPED | OUTPATIENT
Start: 2023-09-21 | End: 2023-11-24 | Stop reason: SDUPTHER

## 2023-10-02 NOTE — OP NOTE
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  LEFT KNEE REVISION ARTHROPLASTY OF BOTH COMPONENTS    Anesthesia: No anesthesiologist associated with this case  Estimated Blood Loss: 300cc  Findings: see op note  Additional Details: MRN:[id number]  Surgery Date: 5/3/2023      Anesthesia: preop block, general, local    EBL: 300cc    Complications: none    Drains:  none     Procedure(s):  OPERATIONS:   - Both component total knee arthroplasty revision CPT code 07246  - removal of implants femoral and tibial - extensile exposure and resection of scar tissue with extensive synovectomy.        Case Complexity:  High.  A 22 modifier is added to the case for removal of total knee components.  A 22 modifier is also added to the case secondary to difficulty of revision knee arthroplasty in the setting of severe bone loss of both the distal femur  and proximal tibia and attenuation of the surrounding soft tissue structures.  This resulted in a more difficult case and resulted in increased operative time and effort compared to a standard knee arthroplasty procedure, and required specific expertise  in complex joint reconstruction techniques to safely complete the operation.     Implants:   Alexa Persona revision   - Femur: Size 9+ with 5mm Medial distal augment, 15mm distal lateral augment, 10 mm posterior medial and lateral augments, [id number] stem  - Tibia: Size F with [id number] stem  Mony Size B bilobed cone     PRE-OPERATIVE DIAGNOSIS:                - FAILED  TOTAL KNEE ARTHROPLASTY , left     POST-OPERATIVE DIAGNOSIS: SAME      OPERATIVE INDICATIONS: This is a patient that presented with a painful left total knee. She was operated on at an outside hospital and had progressive pain. Serial  xrays were reviewed and she had an obviously loose tibial component that was subsided into  varus along with distal femoral osteolysis. Given the continued pain and loosening of implant we discussed surgical intervention.     Revision of the total  knee arthroplasty was recommended at this time. We had a long discussion about the problems associated with the knee. Knee joint balance and spacing was discussed. The revision procedure process itself was then outlined. The risks,  benefits and potential complications of the arthroplasty surgery were discussed with the patient in detail. Specific details of the procedure, hospitalization, recovery, rehabilitation, and long-term precautions were also provided. A long period of post-operative  restricted weight bearing following the procedure was also outlined for appropriate bone and muscle healing and to redevelop appropriate stability. Pre-operative teaching was provided. Implant/prosthesis selection was outlined, and the many options available  were explained; the final choice will be made at the time of the procedure to match the anatomy and condition of the bone, ligaments, tendons, and muscles. Understanding of all topics was conveyed to me by the patient, and consent was given to proceed  with a revision total knee arthroplasty.     The patient was seen by PAT for pre-operative optimization, and risk assessment. Nicole-operative blood management and the potential for blood transfusion were discussed with risks and options clearly outlined. The patient has consented to the use of banked  allogeneic blood if medically necessary. Infectious workup was negative for PJI at this point.     OPERATIVE FINDINGS:    - Femoral component: Well fixed at anterior flange with osteolysis noted at MFC/LFC and appreciable motion at bone/cement interface but not grossly loose  - After removal of femoral component, femoral bone stock demonstrated: significant osteolysis of medial and  femoral condyles most notable distal and posterior  - Tibial component demonstrated: grossly loose and able to be removed manually  - After removal of tibial component, tibial bone stock demonstrated: well maintained bone stock medial and lateral periphery with central metaphyseal defect  -Patellar component demonstrated: well fixed with surrounding HO  - Joint polyethylene demonstrated no excessive wear  -Surrounding soft tissues of the knee demonstrated severe hypertrophic scar tissue  -Ligamentous structures demonstrated: intact MCL and LCL  -Extensor mechanism demonstrated: intact      OPERATIVE PROCEDURE:   The patient was identified and brought to the operating room by Anesthesia and Nursing teams.  Sign in was done with myself present. General Anesthesia was successfully performed.  The patient  was then positioned supine on the  operating room table and all bony prominences were  padded.  Intravenous antibiotic prophylaxis dosing was confirmed.  A padded  tourniquet was applied to the operative upper thigh.  Full exam of the knee was done under anesthesia.  The leg was prepped  and draped in the usual  sterile fashion. Tranexamic was given prior to incision and again at the time of  closure for blood conservation.     Surgical time-out was performed immediately preceding the incision with all personnel in  the  operating  room;  the  patient identity was again confirmed,  the  operative  knee,  surgical  site, and extremity were identified and confirmed.  X-rays  were   reviewed  and  availability of appropriate surgical equipment was established. The  knee  was  exsanguinated  using  Esmarch and the tourniquet was  insufflated.   The  knee  was  exposed as necessary with anterior midline incision,  incorporating  the   patient's  previous  incision.   Incision  was taken down to the  capsule.   Large  medial  and  lateral skin flaps were obtained.  Careful dissection was performed  To dissect full skin  flaps and not to disrupt blood supply to the skin. A medial parapatellar  arthrotomy was  performed,  significant  scarring of synovial  tissue  and hypertrophy was  encountered.        Starting  in  the  inferior medial part of the joint, we established a medial capsular release  of  the  proximal  medial  tibial plateau back to the semimembranosus insertion.   A  radical  synovectomy was performed in a counter-clockwise fashion starting  in the inferior medial area. Three tissue specimens were sent. After performing a  counter-clockwise  radical  synovectomy,  we  improved our exposure by carefully releasing the tissue superior  to  the  tibial tubercle and all way over to Gerdy's tubercle  and also the lateral gutter and  medial  gutters  around the distal femur.       Attention was then turned to the patella.  We removed heterotopic ossification, osteophytes, and redundant soft tissue around  the patellar implant.  Patellar implant  was  well  fixed.   No  asymmetric  wear.   Thus, the patella implant was left.  No  further intervention of the patella was necessary in the revision setting.      Attention was then turned to the femoral component.  Appropriate  femoral  retractors were placed to protect all ligaments, tendons and neurovascular structures. Using a combination of a microsagittal saw and osteotomes both  medial  and  laterally  to  separate the cement mantle from the femoral implant, the femoral implant was removed with the use of bone tamp and mallet. All remaining cement and fibrous tissue was removed from under and around the femoral prosthesis at this time.       At  this  time,  we  turned  our  attention to the tibial component.  Appropriate tibial retractors were placed and we performed a complete synovectomy of all tissue around the tibial implant to improve exposure and assess the bone interface.  Posterior  capsular tissue was then released off of both the posterior tibia and posterior  femoral condyles for purposes of exposure.  The tibial implant found to be grossly loose.  All remaining cement on the plateau and the canal was then removed with a combination  of rongeurs, osteotomes, and the cement removal set.     5 L of sterile saline were taken throughout the entire operative site at this time.  Patient also had dilute Betadine  placed into the wound and allowed to sit for 3 minutes and was thoroughly irrigated.     We then turned our attention to preparing the tibial plateau and canal.  Starting with a #9 reamer we sequentially reamed until we felt that we had good contact with surrounding bone for pressfit stem.  All reamers were buried to the 175 leesa by hand.   Reamer was checked in both the varus and valgus position and also  in  the  anterior-posterior position.  The internal medullary plateau cutting jig was placed over and an appropriate clean-up  cut  of  the  tibial  plateau  at  0  degrees  angulation   was  made.  Bone loss on the tibia demonstrated central metaphyseal bone loss . [employer] tibial cone system used to sequentially ream the metaphysis to an appropriate size, we then placed the asymmetric reamer to ream in the area of bone loss. With reaming  for the tibial cone we paid special attention to the surrounding bone, varus valgus angulation, and also anterior posterior translation to obtain a center center location on the tibia over the tibial canal.  Appropriate sized cone was then impacted into  placed in the appropriate rotation in regards to the tibial tubercle. Rotation was marked. Then the trial tibial construct was formed.  Drop rosales was used to confirm appropriate varus valgus positioning and also slope positioning.     After this was done we returned our attention to the femur.  In approximately 90° of flexion,  the flexion gap was assessed for discrepancy between the medial and lateral sides.  At this time,  we appreciated appropriate rotation compared to the  trans-epicondylar  axis and also Whitesides line.  We also assessed the medial to lateral width and also the size of the explanted component to determine our femoral size.  Sequential reaming of the femoral canal was performed to allow for the appropriate size femoral stem.  Augments were added to the  distal femoral construct to restore the joint line to 28 mm from the medial epicondylar sulcus and 24 mm from the lateral epicondyle  and also to restore 6° of valgus angulation.   Posterior condylar augments were added  to restore rotation that is perpendicular to Whitesides line and parallel to the trans-epicondylar axis and offset. Femoral trial component was placed with trial polyethylene.  Knee taken through a range of motion which demonstrated 0-125 ROM and stability  consisting of stability throughout the range of motion. Patient did sublux the tibial component anteriorly in deep flexion due to her body habitus secondary to morbid obesity.   The central modular component was removed and the femoral keel preparation  was performed..  The varus valgus constrained box jig was then placed and using a saw to cut the medial and lateral condyles to the appropriate depth and width.  Then using a narrow saw blade we made our cut from anterior to posterior in the intercondylar  notch area.   Plan will be for a press-fit stem with hybrid cement fixation of the distal femoral component.  With trial implants in place we trialed a PS polyethylene and found a range of motion of 0° of extension and approximately 125° of flexion.   Varus and valgus stability was found throughout the range of motion including full extension, mid flexion, and flexion.  Appropriate anterior posterior stability demonstrated.     At  this  time,  all  trial  implants were removed.  The tourniquet  was  let  down.  Coagulation of all bleeding was performed  by the Bovie. Using a pulse , we  cleaned  both the tibial and femoral canal and also  the generalized knee with approximately   3  L  of  sterile  saline.   After  approximately 20  minutes,  the  tourniquet  was  insufflated  after  elevation of the leg for approximately  3  minutes.   The  whole  tibial plateau was dried and cleaned at this time. Two bags of regular cement   were  mixed.  Prior  to  mixing,  the  tibial  cone  was  placed and impacted.  Appropriate  rotation which had been marked was noted. Tibial construct with stem was assembled on the back table.   Less viscous cement was placed on the undersurface of  the tibial prosthesis prior to implantation.  Also less viscous cement was placed in the tibial canal for interdigitation. The final  tibial  component  was impacted  into  place.   Component was held in position, with proper rotation.  until  the  cement   hardened  and dried.  All excess cement was removed prior to hardening.       Our attention  was  then  turned  to the femoral component.  The femoral component was assembled  on  the  back table. * The distal femur and canal were irrigated and dried.  Nonantibiotic cement , 2 bags , placed into the cement gun.  Less viscous cement  was placed on the femoral component.   Cement was then placed on the distal femur to encompass all contact with the femoral component.  The femoral implant was then impacted into place. All excess cement was  removed.   Knee  was brought into extension  and then flexion, and again  all  excess  cement  was removed.   Patellar implant as stated before, was left from previous surgery.       A trial polyethylene was then placed mimicking previous trial.  Knee was held in extension until all cement was dry and hardened.  Any remaining hardened cement that was extra was then excised.  Knee was taken through a range of motion which found 0°  of extension and approximately 125° of flexion.  Varus valgus stability was found throughout the range of motion.  Trial polyethylene removed and all excess cement  and possible impinging tissue was removed from the posterior capsule in relation to  the tibial plateau. Retractors were placed and final polyethylene was impacted into place.  Knee was again taken through range of motion which demonstrated 0° of extension and approximately 125° of flexion with excellent stability found with a varus  valgus stress throughout the range of motion.  Appropriate anterior posterior stability found throughout range of motion. Patella also demonstrated midline tracking throughout the range of motion.       Again, the tourniquet  was  let  down  and any bleeding was coagulated with the use of Bovie.  Dilute betadine  bath was allowed to set for 3 minutes and then irrigated out withth 3 L sterile saline.  Pain cocktail placed throughout the capsule and intra-articularly.   Medial parapatellar arthrotomy was closed to a watertight position. Knee was taken through a range of motion to check for any clunking or drainage which none was found.  Subcutaneous tissues on  the  knee  were closed to a watertight closure.  Skin edges  were approximated.  The patient had a incisional VAC placed.  The patient was awoken by Anesthesia, placed back  upon  the  transport  bed,  and  the  patient left the operative  room  stable  and  alert  condition.         POSTOPERATIVE PLANS:   WEIGHT BEARIN% WB operative lower extremity          DVT PPx: Continue prophylactic abx through IV and d/c home with six week oral abx  IMS Consult for Medical Management  Dressing: maintain incision wound vac connected to hospital unit and discharge home with portable unit for one week  Follow intraop cultures  Multimodal Pain control  PT/OT Evaluation, work on gentle ROM and use walker at all times  Discharge plan: home with home health care    Isidro Daugherty DO  Joint Replacement and Adult Reconstructive Surgery  [facility]                                  Attestation:    Note Completion:  Attending Attestation I performed the procedure without a resident         Electronic Signatures:  Isidro Daugherty ()  (Signed 04-May-2023 15:55)   Authored: Post-Operative Note, Chart Review, Note Completion      Last Updated: 04-May-2023 15:55 by Isidro Daugherty ()

## 2023-11-24 ENCOUNTER — ANCILLARY PROCEDURE (OUTPATIENT)
Dept: RADIOLOGY | Facility: CLINIC | Age: 53
End: 2023-11-24
Payer: COMMERCIAL

## 2023-11-24 ENCOUNTER — OFFICE VISIT (OUTPATIENT)
Dept: PRIMARY CARE | Facility: CLINIC | Age: 53
End: 2023-11-24
Payer: COMMERCIAL

## 2023-11-24 VITALS
OXYGEN SATURATION: 97 % | BODY MASS INDEX: 41.56 KG/M2 | HEART RATE: 78 BPM | DIASTOLIC BLOOD PRESSURE: 80 MMHG | WEIGHT: 274.2 LBS | SYSTOLIC BLOOD PRESSURE: 120 MMHG | HEIGHT: 68 IN

## 2023-11-24 DIAGNOSIS — J45.20 MILD INTERMITTENT EXTRINSIC ASTHMA WITHOUT COMPLICATION (HHS-HCC): ICD-10-CM

## 2023-11-24 DIAGNOSIS — Z00.00 ANNUAL PHYSICAL EXAM: Primary | ICD-10-CM

## 2023-11-24 DIAGNOSIS — F41.8 ANXIETY ASSOCIATED WITH DEPRESSION: ICD-10-CM

## 2023-11-24 DIAGNOSIS — M79.671 RIGHT FOOT PAIN: ICD-10-CM

## 2023-11-24 DIAGNOSIS — D64.9 ANEMIA, UNSPECIFIED TYPE: ICD-10-CM

## 2023-11-24 DIAGNOSIS — F51.01 PRIMARY INSOMNIA: ICD-10-CM

## 2023-11-24 DIAGNOSIS — K21.9 GASTROESOPHAGEAL REFLUX DISEASE WITHOUT ESOPHAGITIS: ICD-10-CM

## 2023-11-24 DIAGNOSIS — G47.09 OTHER INSOMNIA: ICD-10-CM

## 2023-11-24 PROCEDURE — 73630 X-RAY EXAM OF FOOT: CPT | Mod: RT,FY

## 2023-11-24 PROCEDURE — 1036F TOBACCO NON-USER: CPT | Performed by: FAMILY MEDICINE

## 2023-11-24 PROCEDURE — 3008F BODY MASS INDEX DOCD: CPT | Performed by: FAMILY MEDICINE

## 2023-11-24 PROCEDURE — 99396 PREV VISIT EST AGE 40-64: CPT | Performed by: FAMILY MEDICINE

## 2023-11-24 PROCEDURE — 99214 OFFICE O/P EST MOD 30 MIN: CPT | Performed by: FAMILY MEDICINE

## 2023-11-24 PROCEDURE — 73630 X-RAY EXAM OF FOOT: CPT | Mod: RIGHT SIDE | Performed by: RADIOLOGY

## 2023-11-24 RX ORDER — MELOXICAM 15 MG/1
15 TABLET ORAL DAILY
Qty: 30 TABLET | Refills: 1 | Status: SHIPPED | OUTPATIENT
Start: 2023-11-24 | End: 2023-12-08 | Stop reason: SDUPTHER

## 2023-11-24 RX ORDER — ESCITALOPRAM OXALATE 10 MG/1
10 TABLET ORAL DAILY
Qty: 90 TABLET | Refills: 3 | Status: SHIPPED | OUTPATIENT
Start: 2023-11-24 | End: 2023-12-27 | Stop reason: DRUGHIGH

## 2023-11-24 RX ORDER — TRAZODONE HYDROCHLORIDE 100 MG/1
100 TABLET ORAL NIGHTLY PRN
Qty: 90 TABLET | Refills: 3 | Status: SHIPPED | OUTPATIENT
Start: 2023-11-24 | End: 2024-11-23

## 2023-11-24 RX ORDER — OMEPRAZOLE 20 MG/1
20 CAPSULE, DELAYED RELEASE ORAL DAILY
Qty: 90 CAPSULE | Refills: 3 | Status: SHIPPED | OUTPATIENT
Start: 2023-11-24 | End: 2024-01-22 | Stop reason: SDUPTHER

## 2023-11-24 RX ORDER — BUPROPION HYDROCHLORIDE 300 MG/1
300 TABLET ORAL DAILY
Qty: 90 TABLET | Refills: 3 | Status: SHIPPED | OUTPATIENT
Start: 2023-11-24 | End: 2024-01-22 | Stop reason: SDUPTHER

## 2023-11-24 RX ORDER — TRAZODONE HYDROCHLORIDE 100 MG/1
100 TABLET ORAL NIGHTLY PRN
Qty: 90 TABLET | Refills: 3 | Status: SHIPPED | OUTPATIENT
Start: 2023-11-24 | End: 2023-11-24 | Stop reason: SDUPTHER

## 2023-11-24 RX ORDER — TRAZODONE HYDROCHLORIDE 50 MG/1
50 TABLET ORAL NIGHTLY PRN
Qty: 30 TABLET | Refills: 1 | OUTPATIENT
Start: 2023-11-24

## 2023-11-24 ASSESSMENT — PATIENT HEALTH QUESTIONNAIRE - PHQ9
2. FEELING DOWN, DEPRESSED OR HOPELESS: NEARLY EVERY DAY
SUM OF ALL RESPONSES TO PHQ QUESTIONS 1-9: 16
SUM OF ALL RESPONSES TO PHQ9 QUESTIONS 1 AND 2: 5
4. FEELING TIRED OR HAVING LITTLE ENERGY: MORE THAN HALF THE DAYS
9. THOUGHTS THAT YOU WOULD BE BETTER OFF DEAD, OR OF HURTING YOURSELF: NOT AT ALL
1. LITTLE INTEREST OR PLEASURE IN DOING THINGS: MORE THAN HALF THE DAYS
10. IF YOU CHECKED OFF ANY PROBLEMS, HOW DIFFICULT HAVE THESE PROBLEMS MADE IT FOR YOU TO DO YOUR WORK, TAKE CARE OF THINGS AT HOME, OR GET ALONG WITH OTHER PEOPLE: SOMEWHAT DIFFICULT
8. MOVING OR SPEAKING SO SLOWLY THAT OTHER PEOPLE COULD HAVE NOTICED. OR THE OPPOSITE, BEING SO FIGETY OR RESTLESS THAT YOU HAVE BEEN MOVING AROUND A LOT MORE THAN USUAL: NOT AT ALL
5. POOR APPETITE OR OVEREATING: NEARLY EVERY DAY
3. TROUBLE FALLING OR STAYING ASLEEP OR SLEEPING TOO MUCH: MORE THAN HALF THE DAYS
7. TROUBLE CONCENTRATING ON THINGS, SUCH AS READING THE NEWSPAPER OR WATCHING TELEVISION: SEVERAL DAYS
6. FEELING BAD ABOUT YOURSELF - OR THAT YOU ARE A FAILURE OR HAVE LET YOURSELF OR YOUR FAMILY DOWN: NEARLY EVERY DAY

## 2023-11-24 ASSESSMENT — ANXIETY QUESTIONNAIRES
4. TROUBLE RELAXING: MORE THAN HALF THE DAYS
5. BEING SO RESTLESS THAT IT IS HARD TO SIT STILL: MORE THAN HALF THE DAYS
2. NOT BEING ABLE TO STOP OR CONTROL WORRYING: NEARLY EVERY DAY
IF YOU CHECKED OFF ANY PROBLEMS ON THIS QUESTIONNAIRE, HOW DIFFICULT HAVE THESE PROBLEMS MADE IT FOR YOU TO DO YOUR WORK, TAKE CARE OF THINGS AT HOME, OR GET ALONG WITH OTHER PEOPLE: SOMEWHAT DIFFICULT
6. BECOMING EASILY ANNOYED OR IRRITABLE: NEARLY EVERY DAY
7. FEELING AFRAID AS IF SOMETHING AWFUL MIGHT HAPPEN: SEVERAL DAYS
GAD7 TOTAL SCORE: 17
3. WORRYING TOO MUCH ABOUT DIFFERENT THINGS: NEARLY EVERY DAY
1. FEELING NERVOUS, ANXIOUS, OR ON EDGE: NEARLY EVERY DAY

## 2023-11-24 ASSESSMENT — LIFESTYLE VARIABLES
SKIP TO QUESTIONS 9-10: 1
HOW OFTEN DO YOU HAVE SIX OR MORE DRINKS ON ONE OCCASION: NEVER
AUDIT-C TOTAL SCORE: 4
HOW MANY STANDARD DRINKS CONTAINING ALCOHOL DO YOU HAVE ON A TYPICAL DAY: 1 OR 2
HOW OFTEN DO YOU HAVE A DRINK CONTAINING ALCOHOL: MONTHLY OR LESS
HOW OFTEN DO YOU HAVE A DRINK CONTAINING ALCOHOL: 4 OR MORE TIMES A WEEK
SKIP TO QUESTIONS 9-10: 1
HOW MANY STANDARD DRINKS CONTAINING ALCOHOL DO YOU HAVE ON A TYPICAL DAY: 1 OR 2
AUDIT-C TOTAL SCORE: 1
HOW OFTEN DO YOU HAVE SIX OR MORE DRINKS ON ONE OCCASION: NEVER

## 2023-11-24 NOTE — PROGRESS NOTES
"Subjective   Charo Linder is a 53 y.o. female and is here for a comprehensive physical exam and follow up on the following:    RIGHT FOOT PAIN: walking at work and felt a pop in her foot  Has been bothering her for the last 3-4 days  Seems to be worsening    GERD: controlled on Omeprazole  Had some episodes of increased gassiness but she noticed it was when she was eating smoked salmon frequently.    ANXIETY & DEPRESSION:  Anger issues much improved with addition of Escitalopram.  Has been struggling with change in position at work and home/financial stressors    INSOMNIA: sleeping improved with Trazodone 100 mg, does not sleep much at all without it.    Do you take any herbs or supplements that were not prescribed by a doctor? no  Are you taking calcnoium supplements? no  Are you taking aspirin daily? no    SOC Hx: single,   Tobacco Use: Low Risk  (11/24/2023)    Patient History     Smoking Tobacco Use: Never     Smokeless Tobacco Use: Never     Passive Exposure: Not on file     Alcohol Use: Not At Risk (11/24/2023)    AUDIT-C     Frequency of Alcohol Consumption: 4 or more times a week     Average Number of Drinks: 1 or 2     Frequency of Binge Drinking: Never       DIET: general     EXERCISE:  walking routine at home, not as regular    ELIMINATION: no constipation or diarrhea  COLON CA SCREENING: deferred due insurance    No urinary issues    SLEEP: no issues    MOODS: doing fair despite increased stressors with family  Patient Health Questionnaire-9 Score: 16    ELLYN-7 Total Score: 17     OB/GYN:   Menstrual cycles - stopped at 42  MAMMO: ordered     History:  LMP: No LMP recorded.  Menopause at 42 years  Last pap date: 10/27/2020  Abnormal pap? no    Review of Systems   Review of Systems negative except as noted in HPI and Chief complaint.     Objective     /80 (BP Location: Left arm, Patient Position: Sitting, BP Cuff Size: Adult)   Pulse 78   Ht 1.727 m (5' 8\")   Wt 124 kg (274 lb 3.2 " oz)   SpO2 97%   BMI 41.69 kg/m²      Physical Exam  Constitutional:       General: She is not in acute distress.     Appearance: Normal appearance.   HENT:      Head: Normocephalic and atraumatic.      Right Ear: Tympanic membrane, ear canal and external ear normal.      Left Ear: Tympanic membrane, ear canal and external ear normal.      Nose: Nose normal. No congestion or rhinorrhea.      Mouth/Throat:      Mouth: Mucous membranes are moist.      Pharynx: No oropharyngeal exudate or posterior oropharyngeal erythema.   Eyes:      Extraocular Movements: Extraocular movements intact.      Conjunctiva/sclera: Conjunctivae normal.      Pupils: Pupils are equal, round, and reactive to light.   Neck:      Vascular: No carotid bruit.   Cardiovascular:      Rate and Rhythm: Normal rate and regular rhythm.      Heart sounds: No murmur heard.  Pulmonary:      Effort: Pulmonary effort is normal.      Breath sounds: Normal breath sounds. No wheezing or rhonchi.   Abdominal:      General: Bowel sounds are normal.      Palpations: Abdomen is soft.   Musculoskeletal:         General: No swelling.      Cervical back: No rigidity.      Right lower leg: No edema.      Left lower leg: No edema.   Lymphadenopathy:      Cervical: No cervical adenopathy.   Skin:     General: Skin is warm and dry.      Findings: No rash.   Neurological:      General: No focal deficit present.      Mental Status: She is alert and oriented to person, place, and time.      Cranial Nerves: No cranial nerve deficit.      Gait: Gait normal.      Deep Tendon Reflexes: Reflexes normal.   Psychiatric:         Mood and Affect: Mood normal.         Behavior: Behavior normal.         Assessment/Plan     Healthy female exam.      1. Please have labs drawn at your earliest convenience.  You should fast 12 hours prior to arriving at the lab - during these 12 hours you may have water, black coffee, black tea - nothing with cream or sugar and no solid foods.    Our  office will contact you with results after they have been reviewed.  Please allow 48 - 72 hours for most results, some may take longer.  Please keep in mind that results may post immediately to your online portal, even before we have a chance to review them.  Once we have had the opportunity to review we will post comments and have our staff contact you with results and any instructions.  Please call our office if you do not hear from our office in 7 days.     2. Patient Counseling:  --Nutrition: Stressed importance of moderation in sodium/caffeine intake, saturated fat and cholesterol, caloric balance, sufficient intake of fresh fruits, vegetables, fiber, calcium, iron, and 1 mg of folate supplement per day (for females capable of pregnancy).  --Exercise: Stressed the importance of regular exercise.   --Immunizations reviewed.  --Discussed benefits of screening colonoscopy(patients > 45 years of age)    3. Discussed the patient's BMI with her.  The BMI is above average. The patient received Provided instructions on dietary changes  Provided instructions on exercise  Advised to Increase physical activity because they have an above normal BMI.    Problem List Items Addressed This Visit       Allergic asthma    Anemia    Relevant Orders    CBC    Iron and TIBC    Anxiety associated with depression    Relevant Medications    escitalopram (Lexapro) 10 mg tablet    buPROPion XL (Wellbutrin XL) 300 mg 24 hr tablet    Other Relevant Orders    Follow Up In [facility] - Behavioral Health Collaborative Care [facility]    GERD (gastroesophageal reflux disease)    Relevant Medications    omeprazole (PriLOSEC) 20 mg DR capsule    Insomnia    Relevant Medications    traZODone (Desyrel) 100 mg tablet     Other Visit Diagnoses       Annual physical exam    -  Primary    Right foot pain        Relevant Medications    meloxicam (Mobic) 15 mg tablet    Other Relevant Orders    XR foot right 3+ views             I have discussed  the collaborative care model for this patient's behavioral health care.  Written detailed information and identifying the members of this care team was provided to patient.  They give permission for the Behavioral Health Manager (BHM) and psychiatric consultant to be included in their care with my continued primary management.  Patient made aware that services provided as part of the Collaborative Care Model are subject to insurance billing.     FOLLOW UP 1 MONTH.

## 2023-11-27 DIAGNOSIS — S99.191A OTHER PHYSEAL FRACTURE OF RIGHT METATARSAL, INITIAL ENCOUNTER FOR CLOSED FRACTURE: Primary | ICD-10-CM

## 2023-11-28 ENCOUNTER — OFFICE VISIT (OUTPATIENT)
Dept: ORTHOPEDIC SURGERY | Facility: CLINIC | Age: 53
End: 2023-11-28
Payer: COMMERCIAL

## 2023-11-28 VITALS — WEIGHT: 277 LBS | BODY MASS INDEX: 41.98 KG/M2 | HEIGHT: 68 IN

## 2023-11-28 DIAGNOSIS — M80.879A PATHOLOGICAL FRACTURE OF FOOT DUE TO SECONDARY OSTEOPOROSIS: ICD-10-CM

## 2023-11-28 PROCEDURE — 3008F BODY MASS INDEX DOCD: CPT | Performed by: SPECIALIST

## 2023-11-28 PROCEDURE — L4361 PNEUMA/VAC WALK BOOT PRE OTS: HCPCS | Performed by: SPECIALIST

## 2023-11-28 PROCEDURE — 99203 OFFICE O/P NEW LOW 30 MIN: CPT | Performed by: SPECIALIST

## 2023-11-28 PROCEDURE — 1036F TOBACCO NON-USER: CPT | Performed by: SPECIALIST

## 2023-11-28 NOTE — PROGRESS NOTES
WORK INJURY - states they are still undecided if this is a [facility] case or not.   Works at [employer] and states she was walking, and suddenly felt and heard a pop in her right foot region that progressed with pain and swelling, on 11/20/2023.  Walked on it for 3 days before she went and got it looked at.   Went to urgent care and was given a walking boot.   Taking Tylenol for pain. Icing as needed.     Currently off work.    Xrays done.    Exam: Feet with neutral alignment.  Mild swelling noted to the right foot no ecchymosis no erythema.  Dermis intact neurovascular intact.  Pain to palpation, along with the swelling, to the dorsal fourth metatarsal on the right foot.  Otherwise full range of motion of the ankle hindfoot and MTPJ's.  No proximal leg pain.    Radiographs: Reviewed identified the incomplete fracture of the fourth metatarsal shaft.    Assessment/plan: Stress type fracture right fourth metatarsal shaft.  Patient is placed in a low tide boot, appropriately fitted today the one she received at [facility] urgent care was unacceptably large/ill fitted.  She can weight-bear as tolerated in the boot.  She would be off full duty work until we assess for fracture healing with repeat x-rays in a month.  Also recommended a bone density test as this is likely related to osteoporosis.

## 2023-11-28 NOTE — LETTER
November 28, 2023     Patient: Charo Linder   YOB: 1970   Date of Visit: 11/28/2023       To Whom It May Concern:    It is my medical opinion that Charo Linder  will remain off of work until re-assessed at next office visit .    If you have any questions or concerns, please don't hesitate to call.         Sincerely,        Kane Reynolds MD    CC: No Recipients

## 2023-12-08 ENCOUNTER — TELEPHONE (OUTPATIENT)
Dept: PRIMARY CARE | Facility: CLINIC | Age: 53
End: 2023-12-08
Payer: COMMERCIAL

## 2023-12-08 DIAGNOSIS — M79.671 RIGHT FOOT PAIN: ICD-10-CM

## 2023-12-08 RX ORDER — MELOXICAM 15 MG/1
15 TABLET ORAL DAILY
Qty: 30 TABLET | Refills: 1 | Status: SHIPPED | OUTPATIENT
Start: 2023-12-08 | End: 2024-03-19

## 2023-12-08 NOTE — TELEPHONE ENCOUNTER
Patient called in stating that she never picked up the Meloxicam because the pharmacy said she could not take the Celebrex and Meloxicam together, but she is needing something but she was not sure if you wanted her to start the Meloxicam now? Or refill the Celebrex?

## 2023-12-13 ENCOUNTER — SOCIAL WORK (OUTPATIENT)
Dept: PRIMARY CARE | Facility: CLINIC | Age: 53
End: 2023-12-13
Payer: COMMERCIAL

## 2023-12-13 DIAGNOSIS — F41.8 ANXIETY ASSOCIATED WITH DEPRESSION: ICD-10-CM

## 2023-12-13 ASSESSMENT — MAJOR DEPRESSION INVENTORY (MDI)
HAVE YOU FELT THAT LIFE WASN'T WORTH LIVING: AT NO TIME
HAVE YOU SUFFERED FROM INCREASED APPETITE: SOME OF THE TIME
HAVE YOU HAD TROUBLE SLEEPING AT NIGHT: MOST OF THE TIME
TOTAL SCORE: 27
HAVE YOU FELT LESS SELF-CONFIDENT: ALL THE TIME
HAVE YOU FELT SUBDUED OR SLOWED DOWN: AT NO TIME
HAVE YOU FELT LACKING IN ENERGY AND STRENGTH: MOST OF THE TIME
HAVE YOU SUFFERED FROM REDUCED APPETITE: AT NO TIME

## 2023-12-13 ASSESSMENT — PATIENT HEALTH QUESTIONNAIRE - PHQ9
4. FEELING TIRED OR HAVING LITTLE ENERGY: MORE THAN HALF THE DAYS
9. THOUGHTS THAT YOU WOULD BE BETTER OFF DEAD, OR OF HURTING YOURSELF: NOT AT ALL
8. MOVING OR SPEAKING SO SLOWLY THAT OTHER PEOPLE COULD HAVE NOTICED. OR THE OPPOSITE, BEING SO FIGETY OR RESTLESS THAT YOU HAVE BEEN MOVING AROUND A LOT MORE THAN USUAL: SEVERAL DAYS
1. LITTLE INTEREST OR PLEASURE IN DOING THINGS: MORE THAN HALF THE DAYS
7. TROUBLE CONCENTRATING ON THINGS, SUCH AS READING THE NEWSPAPER OR WATCHING TELEVISION: MORE THAN HALF THE DAYS
5. POOR APPETITE OR OVEREATING: NEARLY EVERY DAY
10. IF YOU CHECKED OFF ANY PROBLEMS, HOW DIFFICULT HAVE THESE PROBLEMS MADE IT FOR YOU TO DO YOUR WORK, TAKE CARE OF THINGS AT HOME, OR GET ALONG WITH OTHER PEOPLE: SOMEWHAT DIFFICULT
3. TROUBLE FALLING OR STAYING ASLEEP: NEARLY EVERY DAY
SUM OF ALL RESPONSES TO PHQ QUESTIONS 1-9: 18
2. FEELING DOWN, DEPRESSED OR HOPELESS: MORE THAN HALF THE DAYS
SUM OF ALL RESPONSES TO PHQ9 QUESTIONS 1 & 2: 4
6. FEELING BAD ABOUT YOURSELF - OR THAT YOU ARE A FAILURE OR HAVE LET YOURSELF OR YOUR FAMILY DOWN: NEARLY EVERY DAY

## 2023-12-13 ASSESSMENT — ANXIETY QUESTIONNAIRES
6. BECOMING EASILY ANNOYED OR IRRITABLE: MORE THAN HALF THE DAYS
3. WORRYING TOO MUCH ABOUT DIFFERENT THINGS: NEARLY EVERY DAY
5. BEING SO RESTLESS THAT IT IS HARD TO SIT STILL: MORE THAN HALF THE DAYS
7. FEELING AFRAID AS IF SOMETHING AWFUL MIGHT HAPPEN: NEARLY EVERY DAY
4. TROUBLE RELAXING: MORE THAN HALF THE DAYS
IF YOU CHECKED OFF ANY PROBLEMS ON THIS QUESTIONNAIRE, HOW DIFFICULT HAVE THESE PROBLEMS MADE IT FOR YOU TO DO YOUR WORK, TAKE CARE OF THINGS AT HOME, OR GET ALONG WITH OTHER PEOPLE: SOMEWHAT DIFFICULT
2. NOT BEING ABLE TO STOP OR CONTROL WORRYING: NEARLY EVERY DAY
1. FEELING NERVOUS, ANXIOUS, OR ON EDGE: NEARLY EVERY DAY
GAD7 TOTAL SCORE: 18
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
## 2023-12-15 DIAGNOSIS — M25.511 RIGHT SHOULDER PAIN, UNSPECIFIED CHRONICITY: ICD-10-CM

## 2023-12-17 DIAGNOSIS — J45.909 UNSPECIFIED ASTHMA, UNCOMPLICATED (HHS-HCC): ICD-10-CM

## 2023-12-18 ENCOUNTER — DOCUMENTATION (OUTPATIENT)
Dept: BEHAVIORAL HEALTH | Facility: CLINIC | Age: 53
End: 2023-12-18

## 2023-12-18 ENCOUNTER — SOCIAL WORK (OUTPATIENT)
Dept: PRIMARY CARE | Facility: CLINIC | Age: 53
End: 2023-12-18
Payer: COMMERCIAL

## 2023-12-18 NOTE — Clinical Note
JUAREZ regarding my recommendations based on our team's discussion in collaborative care; in brief I'd suggest increasing her escitalopram to 15mg. I'd also suggest iron supplementation given her history of chronic anemia, and checking a fasting glucose / HgbA1c (her last glucose in May was 176). She may also benefit from engaging in EtOH supports given her consumption reported.   Please feel free to reach out with any questions / comments / feedback / concerns.   Ancelmo Nation

## 2023-12-18 NOTE — PROGRESS NOTES
"Collaborative Care (CoCM)  Progress Note    Type of Interaction: Virtual    Start Time: 10:00 AM    End Time: 10:34 AM    Appointment: Scheduled    Reason for Visit:   Chief Complaint   Patient presents with    Follow-up      Interval History / Patient Symptoms:     Patient Health Questionnaire-9 Score: 16 (12/15/2023  9:48 AM)  ELLYN-7 Total Score: 18 (12/13/2023 10:38 AM)        Interventions Provided: Treatment Planning      Progress Made: N/A    Response to Intervention: Reviewed consulting psychiatrist recommendations and clarified areas of treatment.  Discussed with patient whether she feels Wellbutrin is affective- pt reports that it is but doesn't curb anger issues, so escitalopram was started at that point.  Reviewed expectations of medication and the importance of engaging in psychotherapy services in addition to medication to help with anger management.    Discussed importance of hydration, sleeping and decreasing alcohol use.  Pt reports that she hasn't had a [place] iced tea lately, but will drink 1-2 drinks a night when she does.  Discussed exploring the book \"This Naked Mind\" by Latoya Che for alcohol cessation.  Pt takes trazodone almost nightly, with sporatic nights of not taking it.  Discussed possible referral for DAVID testing. Pt is willing.  Consulting psychiatrist recommends increasing lexapro to 15mg; patient is in agreement.    Plan:   Follow up scheduled        Follow Up / Next Appointment: Next appointment: 01/03/24      "
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
## 2023-12-20 ENCOUNTER — ANCILLARY PROCEDURE (OUTPATIENT)
Dept: RADIOLOGY | Facility: CLINIC | Age: 53
End: 2023-12-20
Payer: COMMERCIAL

## 2023-12-20 ENCOUNTER — OFFICE VISIT (OUTPATIENT)
Dept: ORTHOPEDIC SURGERY | Facility: CLINIC | Age: 53
End: 2023-12-20
Payer: COMMERCIAL

## 2023-12-20 VITALS — WEIGHT: 277 LBS | HEIGHT: 68 IN | BODY MASS INDEX: 41.98 KG/M2

## 2023-12-20 DIAGNOSIS — M75.01 ADHESIVE CAPSULITIS OF RIGHT SHOULDER: ICD-10-CM

## 2023-12-20 DIAGNOSIS — M25.511 RIGHT SHOULDER PAIN, UNSPECIFIED CHRONICITY: ICD-10-CM

## 2023-12-20 PROCEDURE — 99204 OFFICE O/P NEW MOD 45 MIN: CPT | Performed by: STUDENT IN AN ORGANIZED HEALTH CARE EDUCATION/TRAINING PROGRAM

## 2023-12-20 PROCEDURE — 73030 X-RAY EXAM OF SHOULDER: CPT | Mod: RT

## 2023-12-20 PROCEDURE — 73030 X-RAY EXAM OF SHOULDER: CPT | Mod: RIGHT SIDE | Performed by: RADIOLOGY

## 2023-12-20 PROCEDURE — 1036F TOBACCO NON-USER: CPT | Performed by: STUDENT IN AN ORGANIZED HEALTH CARE EDUCATION/TRAINING PROGRAM

## 2023-12-20 PROCEDURE — 3008F BODY MASS INDEX DOCD: CPT | Performed by: STUDENT IN AN ORGANIZED HEALTH CARE EDUCATION/TRAINING PROGRAM

## 2023-12-20 NOTE — PROGRESS NOTES
PRIMARY CARE PHYSICIAN: Inés Cat DO  REFERRING PROVIDER: No referring provider defined for this encounter.     CONSULT ORTHOPAEDIC: Shoulder Evaluation    ASSESSMENT & PLAN    Impression: 53 y.o. female with right shoulder adhesive capsulitis.    Plan:   I explained to the patient the nature of their diagnosis.  I reviewed their imaging studies with them.    Based on the history, physical exam and imaging studies above, the patient's presentation is consistent with the above diagnosis.  I had a long discussion with the patient regarding their presentation and the treatment options.  We discussed initial nonoperative versus operative management options as well as potential further diagnostic imaging.  I reviewed the patient's imaging studies with her.  We discussed treatment options going forward.  I reviewed the 3 phases of adhesive capsulitis and answered all her questions.  I explained that this can take some time to resolve.  We will help her along the process.  In order to improve her pain and stiffness I provided her with a referral to my colleague Dr. Hoffman for an ultrasound-guided corticosteroid injection into the glenohumeral joint.  I also provided her with a referral to physical therapy for gentle stretching and range of motion with home exercises within the limitations of her pain.    Follow-Up: Patient will follow-up as needed    At the end of the visit, all questions were answered in full. The patient is in agreement with the plan and recommendations. They will call the office with any questions/concerns.    Note dictated with [employer] software. Completed without full typed error editing and sent to avoid delay.       SUBJECTIVE  CHIEF COMPLAINT: Right shoulder pain    HPI: Charo Linder is a 53 y.o. patient. Charo Linder complains of right shoulder pain. Pain has been going on for two months. It has gotten worse over time. Denies any specific traumatic injury.   She notes significant pain deep in her shoulder with progressively worsening stiffness in the shoulder.  No issues with the shoulder in the past.    They deny any associated neck pain.  No numbness, tingling, or paresthesias.    REVIEW OF SYSTEMS  Constitutional: See HPI for pain assessment, No significant weight loss, recent trauma  Cardiovascular: No chest pain, shortness of breath  Respiratory: No difficulty breathing, cough  Gastrointestinal: No nausea, vomiting, diarrhea, constipation  Musculoskeletal: Noted in HPI, positive for pain, restricted motion, stiffness  Integumentary: No rashes, easy bruising, redness   Neurological: no numbness or tingling in extremities, no gait disturbances   Psychiatric: No mood changes, memory changes, social issues  Heme/Lymph: no excessive swelling, easy bruising, excessive bleeding  ENT: No hearing changes  Eyes: No vision changes    Past Medical History:   Diagnosis Date    Personal history of other diseases of the circulatory system 03/01/2016    History of mitral valve prolapse    Personal history of other diseases of the nervous system and sense organs     History of migraine headaches    Personal history of other diseases of the respiratory system 02/13/2020    History of acute sinusitis    Personal history of other diseases of the respiratory system     Personal history of asthma    Personal history of other medical treatment     History of screening mammography    Urinary tract infection, site not specified 02/25/2020    Acute UTI        Allergies   Allergen Reactions    Codeine Shortness of breath    Erythromycin Other     Upset Stomach        Past Surgical History:   Procedure Laterality Date    TONSILLECTOMY  05/12/2014    Tonsillectomy With Adenoidectomy    TOTAL KNEE ARTHROPLASTY  01/07/2016    Knee Replacement        No family history on file.     Social History     Socioeconomic History    Marital status:      Spouse name: Not on file    Number of  children: Not on file    Years of education: Not on file    Highest education level: Not on file   Occupational History    Not on file   Tobacco Use    Smoking status: Never    Smokeless tobacco: Never   Substance and Sexual Activity    Alcohol use: Yes     Comment: social    Drug use: Never    Sexual activity: Not on file   Other Topics Concern    Not on file   Social History Narrative    Not on file     Social Determinants of Health     Financial Resource Strain: Not on file   Food Insecurity: Not on file   Transportation Needs: Not on file   Physical Activity: Not on file   Stress: Not on file   Social Connections: Not on file   Intimate Partner Violence: Not on file   Housing Stability: Not on file        CURRENT MEDICATIONS:   Current Outpatient Medications   Medication Sig Dispense Refill    acetaminophen (Tylenol) 500 mg tablet TAKE 2 TABLETS BY MOUTH EVERY 6-8 HOURS FOR PAIN 90 tablet 2    albuterol 90 mcg/actuation inhaler Inhale 2 puffs every 4 hours if needed for wheezing.      buPROPion XL (Wellbutrin XL) 300 mg 24 hr tablet Take 1 tablet (300 mg) by mouth once daily. as directed 90 tablet 3    escitalopram (Lexapro) 10 mg tablet Take 1 tablet (10 mg) by mouth once daily. 90 tablet 3    fluticasone-umeclidin-vilanter (TRELEGY-ELLIPTA) 100-62.5-25 mcg blister with device Inhale 1 puff once daily.      meloxicam (Mobic) 15 mg tablet Take 1 tablet (15 mg) by mouth once daily. 30 tablet 1    omeprazole (PriLOSEC) 20 mg DR capsule Take 1 capsule (20 mg) by mouth once daily. 90 capsule 3    sennosides (Senokot) 8.6 mg tablet TAKE 1 TABLET BY MOUTH TWO TIMES A DAY AS NEEDED TO PREVENT CONSTIPATION 30 tablet 1    traZODone (Desyrel) 100 mg tablet Take 1 tablet (100 mg) by mouth as needed at bedtime for sleep. 90 tablet 3    triamcinolone (Kenalog) 0.1 % cream Apply 1 Application topically in the morning and 1 Application before bedtime.       No current facility-administered medications for this visit.     "    OBJECTIVE    PHYSICAL EXAM      8/26/2022     9:54 AM 3/17/2023     3:06 PM 3/21/2023     1:26 PM 6/19/2023    10:41 AM 7/31/2023    11:48 AM 11/24/2023    10:09 AM 11/28/2023     8:44 AM   Vitals   Systolic 124 124    120    Diastolic 88 84    80    Heart Rate 90 85    78    Temp 36.1 °C (96.9 °F) 36.3 °C (97.3 °F)        Height (in)  1.727 m (5' 8\") 1.727 m (5' 8\") 1.727 m (5' 8\") 1.727 m (5' 8\") 1.727 m (5' 8\") 1.727 m (5' 8\")   Weight (lb) 277 268 268 265 271 274.2 277   BMI 42.12 kg/m2 40.75 kg/m2 40.75 kg/m2 40.29 kg/m2 41.21 kg/m2 41.69 kg/m2 42.12 kg/m2   BSA (m2) 2.46 m2 2.42 m2 2.42 m2 2.4 m2 2.43 m2 2.44 m2 2.46 m2   Visit Report  Report    Report Report      There is no height or weight on file to calculate BMI.    GENERAL: A/Ox3, NAD. Appears healthy, well nourished  PSYCHIATRIC: Mood stable, appropriate memory recall  EYES: EOM intact, no scleral icterus  CARDIOVASCULAR: Palpable peripheral pulses  LUNGS: Breathing non-labored on room air  SKIN: no erythema, rashes, or ecchymosis     MUSCULOSKELETAL:  Laterality: right Shoulder Exam  - Negative Spurlings, full painless neck ROM  - Skin intact  - No erythema or warmth  - No ecchymosis or soft tissue swelling  - Shoulder ROM: Forward flexion to 150, ER 30 (ER 60 on the left), IR lower lumbar  - Strength:      Jobes 5-/5     ER 5-/5     Belly press and bearhug 5-/5  - Palpation: Positive tenderness posterior joint line, biceps groove and posterolateral acromion  - Cary and Neers positive  - Speeds and O'Briens equivocal    NEUROVASCULAR:  - Neurovascular Status: sensation intact to light touch distally, upper extremity motor grossly intact  - Capillary refill brisk at extremities, Bilateral peripheral pulses 2+    Imaging: Multiple views of the affected right shoulder(s) demonstrate: No significant osseous normality, no fracture, no significant degenerative change.   X-rays were personally reviewed and interpreted by me.  Radiology reports were " reviewed by me as well, if readily available at the time.      Tam Ledezma MD  Attending Surgeon    Sports Medicine Orthopaedic Surgery  [facility] Medicine [facility]

## 2023-12-22 DIAGNOSIS — M80.879A PATHOLOGICAL FRACTURE OF FOOT DUE TO SECONDARY OSTEOPOROSIS: ICD-10-CM

## 2023-12-22 DIAGNOSIS — R73.09 ABNORMAL GLUCOSE: Primary | ICD-10-CM

## 2023-12-27 ENCOUNTER — HOSPITAL ENCOUNTER (OUTPATIENT)
Dept: RADIOLOGY | Facility: HOSPITAL | Age: 53
Discharge: HOME | End: 2023-12-27
Payer: COMMERCIAL

## 2023-12-27 ENCOUNTER — OFFICE VISIT (OUTPATIENT)
Dept: ORTHOPEDIC SURGERY | Facility: CLINIC | Age: 53
End: 2023-12-27
Payer: COMMERCIAL

## 2023-12-27 VITALS — WEIGHT: 277 LBS | HEIGHT: 68 IN | BODY MASS INDEX: 41.98 KG/M2

## 2023-12-27 DIAGNOSIS — M80.879A PATHOLOGICAL FRACTURE OF FOOT DUE TO SECONDARY OSTEOPOROSIS: ICD-10-CM

## 2023-12-27 DIAGNOSIS — S92.341D: Primary | ICD-10-CM

## 2023-12-27 DIAGNOSIS — F41.8 ANXIETY ASSOCIATED WITH DEPRESSION: ICD-10-CM

## 2023-12-27 PROCEDURE — 1036F TOBACCO NON-USER: CPT | Performed by: SPECIALIST

## 2023-12-27 PROCEDURE — 73630 X-RAY EXAM OF FOOT: CPT | Mod: RT

## 2023-12-27 PROCEDURE — 3008F BODY MASS INDEX DOCD: CPT | Performed by: SPECIALIST

## 2023-12-27 PROCEDURE — 73630 X-RAY EXAM OF FOOT: CPT | Mod: RIGHT SIDE | Performed by: STUDENT IN AN ORGANIZED HEALTH CARE EDUCATION/TRAINING PROGRAM

## 2023-12-27 PROCEDURE — 99213 OFFICE O/P EST LOW 20 MIN: CPT | Performed by: SPECIALIST

## 2023-12-27 RX ORDER — ESCITALOPRAM OXALATE 10 MG/1
15 TABLET ORAL DAILY
Qty: 90 TABLET | Refills: 0 | Status: SHIPPED | OUTPATIENT
Start: 2023-12-27 | End: 2024-01-10 | Stop reason: SDUPTHER

## 2023-12-27 RX ORDER — ALBUTEROL SULFATE 90 UG/1
2 AEROSOL, METERED RESPIRATORY (INHALATION) EVERY 4 HOURS PRN
Qty: 18 G | Refills: 1 | Status: SHIPPED | OUTPATIENT
Start: 2023-12-27 | End: 2024-06-01

## 2023-12-27 NOTE — PROGRESS NOTES
Right foot stress fracture, she has not been bearing weight she has been keeping her foot elevated. She states states she is having pain when she puts weight on it. , xr today    Exam: Right foot with intact dermis normal neurovascular status.  Pain to palpation mostly centered over the third and fourth metatarsal region.  No pain with ankle subtalar and MTP motion.  Rest exams unremarkable.    Radiographs: 3 view standing right foot identifies progressive healing of the nondisplaced fourth metatarsal shaft fracture.  No other acute abnormality.  She has a mild met adductus foot type.    Assessment/plan: Healing fourth metatarsal fracture right foot we discussed activity advancement weaning from the boot she would like to return to work in 2 weeks.  Follow-up as needed.  She could benefit from custom foot orthotics.

## 2023-12-29 ENCOUNTER — DOCUMENTATION (OUTPATIENT)
Dept: PRIMARY CARE | Facility: CLINIC | Age: 53
End: 2023-12-29
Payer: COMMERCIAL

## 2023-12-29 DIAGNOSIS — F41.8 ANXIETY ASSOCIATED WITH DEPRESSION: Primary | ICD-10-CM

## 2023-12-29 PROCEDURE — 99492 1ST PSYC COLLAB CARE MGMT: CPT | Performed by: FAMILY MEDICINE

## 2023-12-29 PROCEDURE — 99494 1ST/SBSQ PSYC COLLAB CARE: CPT | Performed by: FAMILY MEDICINE

## 2024-01-02 ENCOUNTER — ANCILLARY PROCEDURE (OUTPATIENT)
Dept: RADIOLOGY | Facility: CLINIC | Age: 54
End: 2024-01-02
Payer: COMMERCIAL

## 2024-01-02 DIAGNOSIS — M80.879A PATHOLOGICAL FRACTURE OF FOOT DUE TO SECONDARY OSTEOPOROSIS: ICD-10-CM

## 2024-01-02 PROCEDURE — 77080 DXA BONE DENSITY AXIAL: CPT

## 2024-01-02 PROCEDURE — 77080 DXA BONE DENSITY AXIAL: CPT | Performed by: RADIOLOGY

## 2024-01-03 ENCOUNTER — SOCIAL WORK (OUTPATIENT)
Dept: PRIMARY CARE | Facility: CLINIC | Age: 54
End: 2024-01-03
Payer: COMMERCIAL

## 2024-01-03 PROBLEM — F43.12 CHRONIC POST-TRAUMATIC STRESS DISORDER: Status: ACTIVE | Noted: 2023-12-13

## 2024-01-03 NOTE — PROGRESS NOTES
"Collaborative Care ([facility])  Progress Note    Type of Interaction: Virtual    Start Time: 9:00 AM    End Time: 9:42 AM    Appointment: Scheduled    Reason for Visit:   Chief Complaint   Patient presents with    Follow-up      Interval History / Patient Symptoms:   Subjective   Charo Linder is a 53 y.o. female who presents for follow up for Collaborative Care services. Pt reports depression/anxiety sx as being unchanged since last visit.      General: alert and oriented, in no acute distress  Affect and Behavior: normal perception, normal reasoning, and normal speech pattern and content     No data recorded       Interventions Provided: Treatment Planning    Progress Made: N/A    Response to Intervention:   We discussed the following elements from the last appointment: Pt's overall functioning since last appointment.  No changes noted     We addressed the following components during appointment:   Going back to work on the 15th- realizes everyone is going through something  Can drive now and going to [employer] every Sunday   Been looking for a different job but no success right now  15 mg escitalopram; 300 wellbutrin- patient is agreeable to try; rx previously sent and pt is going to   Currently reading \"You are what you say\"; suggested \"The Body Keeps the Score\" as another resource for trauma    Plan:   Follow up in 2 weeks prior to returning to work    Follow Up / Next Appointment: Next appointment: 01/12/24    "
0

## 2024-01-09 ENCOUNTER — OFFICE VISIT (OUTPATIENT)
Dept: ORTHOPEDIC SURGERY | Facility: CLINIC | Age: 54
End: 2024-01-09
Payer: COMMERCIAL

## 2024-01-09 VITALS — BODY MASS INDEX: 41.98 KG/M2 | WEIGHT: 277 LBS | HEIGHT: 68 IN

## 2024-01-09 DIAGNOSIS — M75.01 ADHESIVE CAPSULITIS OF RIGHT SHOULDER: Primary | ICD-10-CM

## 2024-01-09 DIAGNOSIS — M75.41 ROTATOR CUFF IMPINGEMENT SYNDROME, RIGHT: ICD-10-CM

## 2024-01-09 PROCEDURE — 1036F TOBACCO NON-USER: CPT | Performed by: EMERGENCY MEDICINE

## 2024-01-09 PROCEDURE — 3008F BODY MASS INDEX DOCD: CPT | Performed by: EMERGENCY MEDICINE

## 2024-01-09 PROCEDURE — 20611 DRAIN/INJ JOINT/BURSA W/US: CPT | Performed by: EMERGENCY MEDICINE

## 2024-01-09 PROCEDURE — 99244 OFF/OP CNSLTJ NEW/EST MOD 40: CPT | Performed by: EMERGENCY MEDICINE

## 2024-01-09 RX ORDER — LIDOCAINE HYDROCHLORIDE 10 MG/ML
2 INJECTION INFILTRATION; PERINEURAL
Status: COMPLETED | OUTPATIENT
Start: 2024-01-09 | End: 2024-01-09

## 2024-01-09 RX ORDER — METHYLPREDNISOLONE ACETATE 40 MG/ML
80 INJECTION, SUSPENSION INTRA-ARTICULAR; INTRALESIONAL; INTRAMUSCULAR; SOFT TISSUE
Status: COMPLETED | OUTPATIENT
Start: 2024-01-09 | End: 2024-01-09

## 2024-01-09 RX ADMIN — LIDOCAINE HYDROCHLORIDE 2 ML: 10 INJECTION INFILTRATION; PERINEURAL at 09:23

## 2024-01-09 RX ADMIN — METHYLPREDNISOLONE ACETATE 80 MG: 40 INJECTION, SUSPENSION INTRA-ARTICULAR; INTRALESIONAL; INTRAMUSCULAR; SOFT TISSUE at 09:23

## 2024-01-09 NOTE — PROGRESS NOTES
Subjective    Patient ID: Charo Linder is a 53 y.o. female.    Chief Complaint: Pain of the Right Shoulder (Referred by Dr. Ledezma for glenohumeral injection/Xr 12/20/23)     Last Surgery: No surgery found  Last Surgery Date: No surgery found    Charo is a very pleasant 53-year-old female coming in with pain in her right shoulder referred here by Dr. Ledezma for a possible cortisone injection into her glenohumeral joint under ultrasound guidance and management of adhesive capsulitis of the right shoulder.  She has been having pain for months but denies any known traumatic mechanism or injuries.  This could be an overuse injury as her pain started gradually and eventually progressed to the point where she has had decreased range of motion.  Her pain is diffusely involving her entire right shoulder but is mostly anterior lateral.  She is now having trouble doing her hair and reaching behind her head.  At night her pain is much worse.  She was recently given a prescription for meloxicam as well as physical therapy and is interested in a cortisone shot here today.        Objective   Right Shoulder Exam     Tenderness   The patient is experiencing no tenderness.    Range of Motion   Active abduction:  120 abnormal   Passive abduction:  150 abnormal   External rotation:  normal   Forward flexion:  90 abnormal   Internal rotation 0 degrees:  abnormal   Internal rotation 90 degrees:  abnormal     Muscle Strength   Abduction: 4/5   Internal rotation: 5/5   External rotation: 4/5   Supraspinatus: 4/5   Biceps: 5/5     Tests   Cary test: positive  Impingement: positive    Other   Erythema: absent  Sensation: normal  Pulse: present    Comments:  Impingement signs positive      Left Shoulder Exam   Left shoulder exam is normal.           Image Results:  X-rays of the right shoulder were reviewed and interpreted by me on 1/9/2024 and were grossly unremarkable with only some possible narrowing of the subacromial joint  space but overall no evidence of acute injuries or fractures.    Patient ID: Charo Linder is a 53 y.o. female.    L Inj/Asp: L glenohumeral on 1/9/2024 9:23 AM  Indications: pain  Details: 22 G needle, ultrasound-guided posterior approach  Medications: 80 mg methylPREDNISolone acetate 40 mg/mL; 2 mL lidocaine 10 mg/mL (1 %)  Outcome: tolerated well, no immediate complications  Procedure, treatment alternatives, risks and benefits explained, specific risks discussed. Consent was given by the patient. Patient was prepped and draped in the usual sterile fashion.         Assessment/Plan   Encounter Diagnoses:  Adhesive capsulitis of right shoulder    Rotator cuff impingement syndrome, right    Orders Placed This Encounter    L Inj/Asp    Point of Care Ultrasound     No follow-ups on file.    Patient is a 53-year-old female coming in today with acute on chronic right shoulder pain and adhesive capsulitis likely secondary to rotator cuff impingement and tendinitis of the right shoulder.  We discussed her treatment options and agreed for her to continue with physical therapy, home exercises, and her meloxicam.  We also agreed to perform an ultrasound-guided cortisone injection of the right glenohumeral joint.  The patient tolerated the procedure well without any complications.  Activity modifications were reviewed.  She will follow-up with me in 4 weeks and if she has not responded well to this treatment plan we could potentially perform a right-sided subacromial injection at that time.    ** Please excuse any errors in grammar or translation related to this dictation. Voice recognition software was utilized to prepare this document. **       Almas Hoffman MD  [facility] Sports Medicine

## 2024-01-10 ENCOUNTER — PATIENT MESSAGE (OUTPATIENT)
Dept: PRIMARY CARE | Facility: CLINIC | Age: 54
End: 2024-01-10
Payer: COMMERCIAL

## 2024-01-10 DIAGNOSIS — F41.8 ANXIETY ASSOCIATED WITH DEPRESSION: ICD-10-CM

## 2024-01-10 RX ORDER — ESCITALOPRAM OXALATE 10 MG/1
15 TABLET ORAL DAILY
Qty: 45 TABLET | Refills: 0 | Status: SHIPPED | OUTPATIENT
Start: 2024-01-10 | End: 2024-01-22 | Stop reason: SDUPTHER

## 2024-01-10 NOTE — TELEPHONE ENCOUNTER
Good morning, Patient needs refill of :    Escitalopram (Lexapro) 10 mg    Take 1.5 tablets by mouth once daily   (3 days left)    Pharmacy : [facility]- [phone]     Next appointment : January 22, 2024    Thanks...

## 2024-01-12 ENCOUNTER — SOCIAL WORK (OUTPATIENT)
Dept: PRIMARY CARE | Facility: CLINIC | Age: 54
End: 2024-01-12
Payer: COMMERCIAL

## 2024-01-12 ENCOUNTER — EVALUATION (OUTPATIENT)
Dept: PHYSICAL THERAPY | Facility: CLINIC | Age: 54
End: 2024-01-12
Payer: COMMERCIAL

## 2024-01-12 DIAGNOSIS — M25.611 DECREASED ROM OF RIGHT SHOULDER: Primary | ICD-10-CM

## 2024-01-12 DIAGNOSIS — F43.12 CHRONIC POST-TRAUMATIC STRESS DISORDER: Primary | ICD-10-CM

## 2024-01-12 DIAGNOSIS — M75.01 ADHESIVE CAPSULITIS OF RIGHT SHOULDER: ICD-10-CM

## 2024-01-12 DIAGNOSIS — R29.898 SHOULDER WEAKNESS: ICD-10-CM

## 2024-01-12 PROCEDURE — 97110 THERAPEUTIC EXERCISES: CPT | Mod: GP

## 2024-01-12 PROCEDURE — 97161 PT EVAL LOW COMPLEX 20 MIN: CPT | Mod: GP

## 2024-01-12 ASSESSMENT — PATIENT HEALTH QUESTIONNAIRE - PHQ9
9. THOUGHTS THAT YOU WOULD BE BETTER OFF DEAD, OR OF HURTING YOURSELF: NOT AT ALL
3. TROUBLE FALLING OR STAYING ASLEEP OR SLEEPING TOO MUCH: MORE THAN HALF THE DAYS
5. POOR APPETITE OR OVEREATING: MORE THAN HALF THE DAYS
2. FEELING DOWN, DEPRESSED OR HOPELESS: NEARLY EVERY DAY
10. IF YOU CHECKED OFF ANY PROBLEMS, HOW DIFFICULT HAVE THESE PROBLEMS MADE IT FOR YOU TO DO YOUR WORK, TAKE CARE OF THINGS AT HOME, OR GET ALONG WITH OTHER PEOPLE: VERY DIFFICULT
SUM OF ALL RESPONSES TO PHQ9 QUESTIONS 1 AND 2: 4
4. FEELING TIRED OR HAVING LITTLE ENERGY: MORE THAN HALF THE DAYS
1. LITTLE INTEREST OR PLEASURE IN DOING THINGS: SEVERAL DAYS
SUM OF ALL RESPONSES TO PHQ QUESTIONS 1-9: 16
8. MOVING OR SPEAKING SO SLOWLY THAT OTHER PEOPLE COULD HAVE NOTICED. OR THE OPPOSITE, BEING SO FIGETY OR RESTLESS THAT YOU HAVE BEEN MOVING AROUND A LOT MORE THAN USUAL: MORE THAN HALF THE DAYS
6. FEELING BAD ABOUT YOURSELF - OR THAT YOU ARE A FAILURE OR HAVE LET YOURSELF OR YOUR FAMILY DOWN: NEARLY EVERY DAY
7. TROUBLE CONCENTRATING ON THINGS, SUCH AS READING THE NEWSPAPER OR WATCHING TELEVISION: SEVERAL DAYS

## 2024-01-12 ASSESSMENT — ENCOUNTER SYMPTOMS
LOSS OF SENSATION IN FEET: 0
DEPRESSION: 1
OCCASIONAL FEELINGS OF UNSTEADINESS: 0

## 2024-01-12 ASSESSMENT — PAIN - FUNCTIONAL ASSESSMENT: PAIN_FUNCTIONAL_ASSESSMENT: 0-10

## 2024-01-12 ASSESSMENT — PAIN SCALES - GENERAL: PAINLEVEL_OUTOF10: 0 - NO PAIN

## 2024-01-12 NOTE — PROGRESS NOTES
Physical Therapy    Physical Therapy Evaluation and Treatment      Patient Name: Charo Linder  MRN: [id number]  Today's Date: 1/12/2024  Time Calculation  Start Time: 0800  Stop Time: 0845  Time Calculation (min): 45 min    Assessment:    The patient is a 53 year old female presenting to PT with R shoulder pain, limited shoulder mobility, & shoulder weakness.  The patient will benefit from skilled intervention to address above deficits and return to previous activity level.     Plan:  OP PT Plan  Treatment/Interventions: Cryotherapy, Education/ Instruction, Electrical stimulation, Hot pack, Manual therapy, Therapeutic exercises  PT Plan: Skilled PT  PT Frequency: 2 times per week  Rehab Potential: Excellent  Plan of Care Agreement: Patient    Current Problem:   1. Decreased ROM of right shoulder  Follow Up In Physical Therapy      2. Shoulder weakness        3. Adhesive capsulitis of right shoulder  Referral to Physical Therapy          Subjective    General:  General  Reason for Referral: adhesive capsulitis R shoulder  Referred By: Dr. Ledezma  Past Medical History Relevant to Rehab: Reviewed  The patient reports she has been experiencing R shoulder pain for the past several months for reasons unknown.  She states her ROM has been limited as well.  Pain ranges from 0-7/10.  Overhead/reaching activities, brushing her hair, & donning clothes exacerbates pain.  X-rays (-).  No follow up scheduled with Doctor at this time.  The patient's goal is to return to previous activity level without pain.     Precautions:  Precautions  STEADI Fall Risk Score (The score of 4 or more indicates an increased risk of falling): 4  Precautions Comment: none     Pain:  Pain Assessment  Pain Assessment: 0-10  Pain Score: 0 - No pain     Prior Level of Function:  Prior Function Per Pt/Caregiver Report  Level of Curry: Independent with ADLs and functional transfers  Hand Dominance: Right    Objective   R shoulder  "strength  Flexion = 4/5  Abduction = 4/5  IR = 4/5  ER = 4-/5    R shoulder AROM  Flexion = 152  Abduction = 100  IR = WFL  ER = 59    Outcome Measures:  Other Measures  Disability of Arm Shoulder Hand (DASH): 50     Treatments:  ISO IR/ER - x10 ea 5\"H  Seated ER with dowel - x10 10\"H  CP R shoulder - 10'    EDUCATION:   HEP    Goals: By week 8  1) Decrease R shoulder pain with all activities <2/10    2) Increase R shoulder strength to 5/5 for return to previous activity level    3) Increase R shoulder AROM to WNL to ease capability to perform ADLs          "

## 2024-01-12 NOTE — PROGRESS NOTES
Collaborative Care ([facility])  Progress Note    Type of Interaction: Virtual    Start Time: 12:55 PM    End Time: 1:13 PM    Appointment: Scheduled    Reason for Visit:   Chief Complaint   Patient presents with    Follow-up        Interval History / Patient Symptoms:   Subjective   Charo Linder is a 53 y.o. female who presents for follow up for Collaborative Care services.     Patient Health Questionnaire-9 Score: 16 (1/12/2024  7:55 AM)     Interventions Provided: Referrals    Progress Made: Slight    Response to Intervention:   We discussed the following elements from the last appointment: Pt was able to call and set up an appointment with a therapist- first visit was yesterday and went well.  Pt reports that the therapist thinks she will be a good candidate for EMDR and states that she has complex trauma to work through.     We addressed the following components during appointment: Discussed plan moving forward with Collaborative Care.  Pt would like to continue to engage with Collaborative Care for a while longer to monitor medications, until she is at a therapeutic level.  Pt reports that she thinks the lexapro is making her more hungry, Suggested pt to complete a food log to determine if it is medication or what pt is eating daily.  Pt is also less active in the past few weeks not being able to put weight on foot.      Plan:   Pt has a follow up appointment with PCP on 1/22/24, and plans on having bloodwork drawn prior to appointment.  Will meet with M prior to appointment with PCP.  Continue with 15mg Lexapro and 300mg Wellbutrin as prescribed by PCP.    Follow Up / Next Appointment: Next appointment: 01/22/24

## 2024-01-16 ENCOUNTER — LAB (OUTPATIENT)
Dept: LAB | Facility: LAB | Age: 54
End: 2024-01-16
Payer: COMMERCIAL

## 2024-01-16 ENCOUNTER — TREATMENT (OUTPATIENT)
Dept: PHYSICAL THERAPY | Facility: CLINIC | Age: 54
End: 2024-01-16
Payer: COMMERCIAL

## 2024-01-16 DIAGNOSIS — M75.01 ADHESIVE CAPSULITIS OF RIGHT SHOULDER: ICD-10-CM

## 2024-01-16 DIAGNOSIS — M25.611 DECREASED ROM OF RIGHT SHOULDER: ICD-10-CM

## 2024-01-16 DIAGNOSIS — D64.9 ANEMIA, UNSPECIFIED TYPE: ICD-10-CM

## 2024-01-16 DIAGNOSIS — R73.09 ABNORMAL GLUCOSE: ICD-10-CM

## 2024-01-16 DIAGNOSIS — R29.898 SHOULDER WEAKNESS: Primary | ICD-10-CM

## 2024-01-16 LAB
ALBUMIN SERPL BCP-MCNC: 4 G/DL (ref 3.4–5)
ALP SERPL-CCNC: 68 U/L (ref 33–110)
ALT SERPL W P-5'-P-CCNC: 15 U/L (ref 7–45)
ANION GAP SERPL CALC-SCNC: 10 MMOL/L (ref 10–20)
AST SERPL W P-5'-P-CCNC: 17 U/L (ref 9–39)
BILIRUB SERPL-MCNC: 0.8 MG/DL (ref 0–1.2)
BUN SERPL-MCNC: 22 MG/DL (ref 6–23)
CALCIUM SERPL-MCNC: 9.4 MG/DL (ref 8.6–10.3)
CHLORIDE SERPL-SCNC: 104 MMOL/L (ref 98–107)
CO2 SERPL-SCNC: 29 MMOL/L (ref 21–32)
CREAT SERPL-MCNC: 0.76 MG/DL (ref 0.5–1.05)
EGFRCR SERPLBLD CKD-EPI 2021: >90 ML/MIN/1.73M*2
ERYTHROCYTE [DISTWIDTH] IN BLOOD BY AUTOMATED COUNT: 19 % (ref 11.5–14.5)
GLUCOSE SERPL-MCNC: 103 MG/DL (ref 74–99)
HCT VFR BLD AUTO: 35.6 % (ref 36–46)
HGB BLD-MCNC: 10.4 G/DL (ref 12–16)
IRON SATN MFR SERPL: ABNORMAL %
IRON SERPL-MCNC: 27 UG/DL (ref 35–150)
MCH RBC QN AUTO: 20.6 PG (ref 26–34)
MCHC RBC AUTO-ENTMCNC: 29.2 G/DL (ref 32–36)
MCV RBC AUTO: 71 FL (ref 80–100)
NRBC BLD-RTO: 0 /100 WBCS (ref 0–0)
PLATELET # BLD AUTO: 312 X10*3/UL (ref 150–450)
POTASSIUM SERPL-SCNC: 4.8 MMOL/L (ref 3.5–5.3)
PROT SERPL-MCNC: 6.7 G/DL (ref 6.4–8.2)
RBC # BLD AUTO: 5.04 X10*6/UL (ref 4–5.2)
SODIUM SERPL-SCNC: 138 MMOL/L (ref 136–145)
TIBC SERPL-MCNC: ABNORMAL UG/DL
UIBC SERPL-MCNC: >450 UG/DL (ref 110–370)
WBC # BLD AUTO: 7.3 X10*3/UL (ref 4.4–11.3)

## 2024-01-16 PROCEDURE — 80053 COMPREHEN METABOLIC PANEL: CPT

## 2024-01-16 PROCEDURE — 97110 THERAPEUTIC EXERCISES: CPT | Mod: GP | Performed by: PHYSICAL THERAPIST

## 2024-01-16 PROCEDURE — 83036 HEMOGLOBIN GLYCOSYLATED A1C: CPT

## 2024-01-16 PROCEDURE — 36415 COLL VENOUS BLD VENIPUNCTURE: CPT

## 2024-01-16 PROCEDURE — 83540 ASSAY OF IRON: CPT

## 2024-01-16 PROCEDURE — 83550 IRON BINDING TEST: CPT

## 2024-01-16 PROCEDURE — 85027 COMPLETE CBC AUTOMATED: CPT

## 2024-01-16 ASSESSMENT — PAIN - FUNCTIONAL ASSESSMENT: PAIN_FUNCTIONAL_ASSESSMENT: 0-10

## 2024-01-16 ASSESSMENT — PAIN SCALES - GENERAL: PAINLEVEL_OUTOF10: 0 - NO PAIN

## 2024-01-16 NOTE — PROGRESS NOTES
"Physical Therapy    Physical Therapy Evaluation and Treatment      Patient Name: Charo Linder  MRN: [id number]  Today's Date: 1/16/2024  Time Calculation  Start Time: 0915  Stop Time: 0955  Time Calculation (min): 40 min  Timed minutes: 30    Visit Number: 2    Assessment:   Pt tolerated shoulder ROM and strength exercises well, but did have some pain with pulley scaption and IR. Pain decreased with T-band strengthening, except for ER. Ice after tx eliminated all pain and educated pt to continue to use ice as needed for inflammation reduction if soreness returns.     Plan:   Monitor pt's pain level with additional exercises and progress R shoulder functional mobility as pt tolerates without increased pain.    Current Problem:   1. Shoulder weakness        2. Decreased ROM of right shoulder  Follow Up In Physical Therapy      3. Adhesive capsulitis of right shoulder              Subjective    General:   Pt presents w/reports of no appreciable R shoulder pain today and her first day back to work after 6 weeks was yesterday after foot fx. States that there was R shoulder \"aggravation\" with lifting at work, but no major pain or soreness afterwards. Reports using ice and Tylenol to help decrease pain.    Precautions:  Precautions  STEADI Fall Risk Score (The score of 4 or more indicates an increased risk of falling): 4  Precautions Comment: none     Pain:  Pain Assessment  Pain Assessment: 0-10  Pain Score: 0 - No pain        Objective   Initiated R shoulder AAROM and strengthening exercises    Treatments:  EXERCISE Date 1/16/24 Date Date Date    REPS REPS REPS REPS          UE Bike              Pulleys      Flexion  X3 minutes      Pulleys      Scaption/Abduction X2.5 minutes      Pulleys      IR X1 minute             Tband       Pull down Red 2x10      Tband       Mid rows Red 2x10      Tband       IR Red 1x10      Tband       ER Red 1x10      T-band     Sh adduction Red 1x10             Table slides: flex     " "  Scaption 10\"H x10      I T Y (charito)                                                                             CP after tx X10 minutes                        EDUCATION:   HEP reviewed    Goals: By week 8  1) Decrease R shoulder pain with all activities <2/10    2) Increase R shoulder strength to 5/5 for return to previous activity level    3) Increase R shoulder AROM to WNL to ease capability to perform ADLs    "

## 2024-01-17 LAB
EST. AVERAGE GLUCOSE BLD GHB EST-MCNC: 131 MG/DL
HBA1C MFR BLD: 6.2 %

## 2024-01-18 ENCOUNTER — TREATMENT (OUTPATIENT)
Dept: PHYSICAL THERAPY | Facility: CLINIC | Age: 54
End: 2024-01-18
Payer: COMMERCIAL

## 2024-01-18 DIAGNOSIS — M25.611 DECREASED ROM OF RIGHT SHOULDER: ICD-10-CM

## 2024-01-18 PROCEDURE — 97110 THERAPEUTIC EXERCISES: CPT | Mod: GP,CQ

## 2024-01-18 ASSESSMENT — PAIN SCALES - GENERAL: PAINLEVEL_OUTOF10: 0 - NO PAIN

## 2024-01-18 ASSESSMENT — PAIN - FUNCTIONAL ASSESSMENT: PAIN_FUNCTIONAL_ASSESSMENT: 0-10

## 2024-01-18 NOTE — PROGRESS NOTES
"Physical Therapy    Physical Therapy Evaluation and Treatment      Patient Name: Charo Linder  MRN: [id number]  Today's Date: 1/18/2024  Time Calculation  Start Time: 0800  Stop Time: 0850  Time Calculation (min): 50 min    Visit Number: 3    Assessment:   Pt tolerated progressed shoulder ROM and strength exercises well. No increase in pain but she did have \"tightness\" in the anterior shoulder while doing hughston T. Pt. has been putting ice on her shoulder as needed to manage symptoms.    Plan:   Monitor pt's pain level with additional exercises and progress R shoulder functional mobility as pt tolerates without increased pain.    Current Problem:   1. Decreased ROM of right shoulder  Follow Up In Physical Therapy            Subjective    General:   Pt. reports that her right shoulder is not painful but it is \"aggravated\". She has had significant improvement in ROM since getting a cortisone injection. She will follow up with Dr. Hoffman on 2/6/24.    Precautions:    Precautions  STEADI Fall Risk Score (The score of 4 or more indicates an increased risk of falling): 4  Precautions Comment: none    Pain:  Pain Assessment  Pain Assessment: 0-10  Pain Score: 0 - No pain  Pain Location: Knee  Pain Orientation: Right        Objective   R shoulder strength  Flexion = 4/5  Abduction = 4/5  IR = 4/5  ER = 4-/5    Increased Tband reps   Added Srinivasaston I, T    Treatments:  EXERCISE Date 1/16/24 Date 1/18/24 Date Date    REPS REPS REPS REPS          UE Bike              Pulleys      Flexion  X3 minutes 4 minutes     Pulleys      Scaption/Abduction X2.5 minutes 4 minutes     Pulleys      IR X1 minute 20x            Tband       Pull down Red 2x10 Red 2x10     Tband       Mid rows Red 2x10 Red 2x10     Tband       IR Red 1x10 Red 2x10     Tband       ER Red 1x10 Red 2x10     T-band     Sh adduction Red 1x10 Red 2x10            Table slides: flex  10x10     Scaption 10\"H x10 10x10     I T Y (charito)  I,T 1x10 each Blue " ball                                                                           CP after tx X10 minutes 10 minutes                       EDUCATION:   HEP reviewed    Goals: By week 8  1) Decrease R shoulder pain with all activities <2/10    2) Increase R shoulder strength to 5/5 for return to previous activity level    3) Increase R shoulder AROM to WNL to ease capability to perform ADLs

## 2024-01-22 ENCOUNTER — TELEPHONE (OUTPATIENT)
Dept: PRIMARY CARE | Facility: CLINIC | Age: 54
End: 2024-01-22

## 2024-01-22 ENCOUNTER — TELEMEDICINE (OUTPATIENT)
Dept: PRIMARY CARE | Facility: CLINIC | Age: 54
End: 2024-01-22
Payer: COMMERCIAL

## 2024-01-22 ENCOUNTER — DOCUMENTATION (OUTPATIENT)
Dept: PHYSICAL THERAPY | Facility: CLINIC | Age: 54
End: 2024-01-22

## 2024-01-22 ENCOUNTER — APPOINTMENT (OUTPATIENT)
Dept: PRIMARY CARE | Facility: CLINIC | Age: 54
End: 2024-01-22
Payer: COMMERCIAL

## 2024-01-22 DIAGNOSIS — M79.671 RIGHT FOOT PAIN: ICD-10-CM

## 2024-01-22 DIAGNOSIS — J01.10 ACUTE NON-RECURRENT FRONTAL SINUSITIS: Primary | ICD-10-CM

## 2024-01-22 DIAGNOSIS — J98.01 COUGH DUE TO BRONCHOSPASM: ICD-10-CM

## 2024-01-22 DIAGNOSIS — F41.8 ANXIETY ASSOCIATED WITH DEPRESSION: ICD-10-CM

## 2024-01-22 DIAGNOSIS — E11.8 CONTROLLED TYPE 2 DIABETES MELLITUS WITH COMPLICATION, WITHOUT LONG-TERM CURRENT USE OF INSULIN (MULTI): ICD-10-CM

## 2024-01-22 DIAGNOSIS — D50.9 IRON DEFICIENCY ANEMIA, UNSPECIFIED IRON DEFICIENCY ANEMIA TYPE: ICD-10-CM

## 2024-01-22 DIAGNOSIS — K21.9 GASTROESOPHAGEAL REFLUX DISEASE WITHOUT ESOPHAGITIS: ICD-10-CM

## 2024-01-22 DIAGNOSIS — E66.01 CLASS 3 SEVERE OBESITY DUE TO EXCESS CALORIES WITHOUT SERIOUS COMORBIDITY WITH BODY MASS INDEX (BMI) OF 40.0 TO 44.9 IN ADULT (MULTI): ICD-10-CM

## 2024-01-22 DIAGNOSIS — J45.20 MILD INTERMITTENT EXTRINSIC ASTHMA WITHOUT COMPLICATION (HHS-HCC): ICD-10-CM

## 2024-01-22 PROCEDURE — 99214 OFFICE O/P EST MOD 30 MIN: CPT | Performed by: FAMILY MEDICINE

## 2024-01-22 RX ORDER — METFORMIN HYDROCHLORIDE 500 MG/1
500 TABLET ORAL
Qty: 60 TABLET | Refills: 5 | Status: SHIPPED | OUTPATIENT
Start: 2024-01-22

## 2024-01-22 RX ORDER — CEFDINIR 300 MG/1
300 CAPSULE ORAL 2 TIMES DAILY
Qty: 20 CAPSULE | Refills: 0 | Status: SHIPPED | OUTPATIENT
Start: 2024-01-22 | End: 2024-02-01

## 2024-01-22 RX ORDER — HYDROCODONE BITARTRATE AND HOMATROPINE METHYLBROMIDE ORAL SOLUTION 5; 1.5 MG/5ML; MG/5ML
5 LIQUID ORAL EVERY 6 HOURS PRN
Qty: 120 ML | Refills: 0 | Status: SHIPPED | OUTPATIENT
Start: 2024-01-22 | End: 2024-01-27

## 2024-01-22 RX ORDER — ESCITALOPRAM OXALATE 10 MG/1
15 TABLET ORAL DAILY
Qty: 405 TABLET | Refills: 1 | Status: SHIPPED | OUTPATIENT
Start: 2024-01-22 | End: 2024-03-20 | Stop reason: SDUPTHER

## 2024-01-22 RX ORDER — OMEPRAZOLE 20 MG/1
20 CAPSULE, DELAYED RELEASE ORAL DAILY
Qty: 90 CAPSULE | Refills: 3 | Status: SHIPPED | OUTPATIENT
Start: 2024-01-22 | End: 2025-01-21

## 2024-01-22 RX ORDER — BUPROPION HYDROCHLORIDE 300 MG/1
300 TABLET ORAL DAILY
Qty: 90 TABLET | Refills: 3 | Status: SHIPPED | OUTPATIENT
Start: 2024-01-22

## 2024-01-22 ASSESSMENT — LIFESTYLE VARIABLES
HOW OFTEN DO YOU HAVE A DRINK CONTAINING ALCOHOL: 2-4 TIMES A MONTH
HOW MANY STANDARD DRINKS CONTAINING ALCOHOL DO YOU HAVE ON A TYPICAL DAY: 3 OR 4
SKIP TO QUESTIONS 9-10: 0
HOW OFTEN DO YOU HAVE SIX OR MORE DRINKS ON ONE OCCASION: LESS THAN MONTHLY
AUDIT-C TOTAL SCORE: 4

## 2024-01-22 NOTE — ASSESSMENT & PLAN NOTE
Continue with Trelegy and prn Albuterol.  Follow up at least annually - sooner with any acute flares.

## 2024-01-22 NOTE — PROGRESS NOTES
Physical Therapy                 Therapy Communication Note    Patient Name: Charo Linder  MRN: [id number]  Today's Date: 1/22/2024     Discipline: Physical Therapy    Missed Time: No Show

## 2024-01-22 NOTE — PROGRESS NOTES
Subjective   Patient ID: Charo Linder is a 53 y.o. female who presents for Follow-up (Discuss blood work).    This visit was completed via  [employer]'s virtual platform.  All issues as below were discussed and addressed but no physical exam was performed.  If it was felt that the patient should be evaluated in clinic then they were directed there.  The patient verbally consented to visit.    Provider was located at our main office [address] in  [facility] and patient was located at a remote facility in [place].    HPI    COUGH & SINUS SX:   Started with sore throat 7 days ago with fever and chills better  Headache persists  Greenish phlegm  Wheezing off and on   COVID NEGATIVE    ABNORMAL GLUCOSE:  Review of recent fasting labs with Hba1c - 6.2% slightly improved from last labs a few year ago     Last Hba1c 6.5%    DEPRESSION:  Seeing counselor every other week AND seems to be doing better  Talks to Akila frequently as well    ANEMIA: slightly improved but iron levels still low  Taking MVI with iron component in it.  No EGD or COLONOSCOPY yet.      Review of Systems    Review of Systems negative except as noted in HPI and Chief complaint.     Objective                 There were no vitals taken for this visit.     Physical Exam    Assessment/Plan   Problem List Items Addressed This Visit       Allergic asthma     Continue with Trelegy and prn Albuterol.  Follow up at least annually - sooner with any acute flares.         Anemia     Reviewed recent labs - persistent anemia and low iron stores.  Adding rx for iron - start once acute illness resolved.  Repeat labs in 4/2024 with CPE labs.         Relevant Medications    ferrous fumarate-vitamin B12-vitamin C-folic acid (Foltrin) 110-0.5 mg capsule    Anxiety associated with depression     Improved - continue with counseling and refilling medications at current dosages.  Follow up with Behavioral Health Collaborative care team.  Follow up in 3-6  months with CPE - sooner with any problems or concerns.         Relevant Medications    escitalopram (Lexapro) 10 mg tablet    buPROPion XL (Wellbutrin XL) 300 mg 24 hr tablet    GERD (gastroesophageal reflux disease)     Stable on Omeprazole.  Continue with dietary modifications: limiting caffeine, spearmint, alcohol, NSAIDs; remaining upright for at least and hour after eating.  Call if symptoms not improving - monitoring for anemia, consider EGD if symptoms worsen.         Relevant Medications    omeprazole (PriLOSEC) 20 mg DR capsule     Other Visit Diagnoses       Acute non-recurrent frontal sinusitis    -  Primary    Starting Cefdinir.  Symptomatic care reviewed.    Relevant Medications    cefdinir (Omnicef) 300 mg capsule    Right foot pain        Cough due to bronchospasm        Relevant Medications    fluticasone-umeclidin-vilanter (TRELEGY-ELLIPTA) 100-62.5-25 mcg blister with device    hydrocodone-homatropine (Hycodan) 5-1.5 mg/5 mL syrup    Controlled type 2 diabetes mellitus with complication, without long-term current use of insulin (CMS/[facility])        Relevant Medications    metFORMIN (Glucophage) 500 mg tablet    Other Relevant Orders    Referral to Nutrition Services    Class 3 severe obesity due to excess calories without serious comorbidity with body mass index (BMI) of 40.0 to 44.9 in adult (CMS/[facility])        Relevant Orders    Referral to Nutrition Services          Treating cough with prn Hycodan at bedtime.  Caution as may cause drowsiness.  This is a controlled substance which must be kept in a safe place away from children and others.  It is a closely monitored substance and should be used sparingly for severe cough.  [place]ARRS report reviewed and scanned without suspicious activity.

## 2024-01-22 NOTE — PROGRESS NOTES
Pt reports that she has follow up appointments scheduled for therapy at [facility]- pt is going to start EMDR therapy for complex trauma dx.  Next appointment is Feb 6 due to having to work this Thursday.  Pt stated that she asked her work for Thursdays off so she can attend therapy sessions and was given Thursdays off.  M will follow up with patient in 1 month to check in, prior to discharging.    Goal of Collaborative Care per PCP was to bridge the gap until pt could be seen by a therapist long term, which has been accomplished.  Pt med management is stable at this time, per patient.

## 2024-01-22 NOTE — ASSESSMENT & PLAN NOTE
Improved - continue with counseling and refilling medications at current dosages.  Follow up with Behavioral Health Collaborative care team.  Follow up in 3-6 months with CPE - sooner with any problems or concerns.

## 2024-01-22 NOTE — ASSESSMENT & PLAN NOTE
Reviewed recent labs - persistent anemia and low iron stores.  Adding rx for iron - start once acute illness resolved.  Repeat labs in 4/2024 with CPE labs.

## 2024-01-22 NOTE — ASSESSMENT & PLAN NOTE
Stable on Omeprazole.  Continue with dietary modifications: limiting caffeine, spearmint, alcohol, NSAIDs; remaining upright for at least and hour after eating.  Call if symptoms not improving - monitoring for anemia, consider EGD if symptoms worsen.

## 2024-01-23 ENCOUNTER — APPOINTMENT (OUTPATIENT)
Dept: PHYSICAL THERAPY | Facility: CLINIC | Age: 54
End: 2024-01-23
Payer: COMMERCIAL

## 2024-01-31 ENCOUNTER — DOCUMENTATION (OUTPATIENT)
Dept: PRIMARY CARE | Facility: CLINIC | Age: 54
End: 2024-01-31
Payer: COMMERCIAL

## 2024-01-31 DIAGNOSIS — F43.12 CHRONIC POST-TRAUMATIC STRESS DISORDER: Primary | ICD-10-CM

## 2024-01-31 PROCEDURE — 99493 SBSQ PSYC COLLAB CARE MGMT: CPT | Performed by: FAMILY MEDICINE

## 2024-02-01 ENCOUNTER — APPOINTMENT (OUTPATIENT)
Dept: PHYSICAL THERAPY | Facility: CLINIC | Age: 54
End: 2024-02-01
Payer: COMMERCIAL

## 2024-02-02 ENCOUNTER — APPOINTMENT (OUTPATIENT)
Dept: PHYSICAL THERAPY | Facility: CLINIC | Age: 54
End: 2024-02-02
Payer: COMMERCIAL

## 2024-02-06 ENCOUNTER — OFFICE VISIT (OUTPATIENT)
Dept: ORTHOPEDIC SURGERY | Facility: CLINIC | Age: 54
End: 2024-02-06
Payer: COMMERCIAL

## 2024-02-06 ENCOUNTER — APPOINTMENT (OUTPATIENT)
Dept: PHYSICAL THERAPY | Facility: CLINIC | Age: 54
End: 2024-02-06
Payer: COMMERCIAL

## 2024-02-06 VITALS — HEIGHT: 68 IN | BODY MASS INDEX: 41.98 KG/M2 | WEIGHT: 277 LBS

## 2024-02-06 DIAGNOSIS — M75.41 ROTATOR CUFF IMPINGEMENT SYNDROME, RIGHT: ICD-10-CM

## 2024-02-06 DIAGNOSIS — M75.01 ADHESIVE CAPSULITIS OF RIGHT SHOULDER: Primary | ICD-10-CM

## 2024-02-06 PROCEDURE — 99213 OFFICE O/P EST LOW 20 MIN: CPT | Performed by: EMERGENCY MEDICINE

## 2024-02-06 PROCEDURE — 1036F TOBACCO NON-USER: CPT | Performed by: EMERGENCY MEDICINE

## 2024-02-06 PROCEDURE — 3008F BODY MASS INDEX DOCD: CPT | Performed by: EMERGENCY MEDICINE

## 2024-02-06 NOTE — PROGRESS NOTES
Subjective    Patient ID: Charo Linder is a 53 y.o. female.    Chief Complaint: Follow-up and Pain of the Right Shoulder (GH injection 1/9/24 She is 80% better, more like a deep ache. She has done 4 sessions of PT and is now doing home exercises. )     Last Surgery: No surgery found  Last Surgery Date: No surgery found    Charo is a very pleasant 53-year-old female coming in with pain in her right shoulder referred here by Dr. Ledezma for a possible cortisone injection into her glenohumeral joint under ultrasound guidance and management of adhesive capsulitis of the right shoulder.  She has been having pain for months but denies any known traumatic mechanism or injuries.  This could be an overuse injury as her pain started gradually and eventually progressed to the point where she has had decreased range of motion.  Her pain is diffusely involving her entire right shoulder but is mostly anterior lateral.  She is now having trouble doing her hair and reaching behind her head.  At night her pain is much worse.  She was recently given a prescription for meloxicam as well as physical therapy and is interested in a cortisone shot here today. We agreed for her to continue with physical therapy, home exercises, and her meloxicam.  We also agreed to perform an ultrasound-guided cortisone injection of the right glenohumeral joint.  The patient tolerated the procedure well without any complications.  Activity modifications were reviewed.  She will follow-up with me in 4 weeks and if she has not responded well to this treatment plan we could potentially perform a right-sided subacromial injection at that time.    Update on 2/6/2024.  Patient is coming back in for a follow-up visit.  She had a right glenohumeral cortisone injection on 1/9/2024 and states that she is about 80% better.  Her range of motion has improved and she has been able to go to about 4 physical therapy sessions.  She is sleeping much better and her  pain is a mild dull ache.        Objective   Right Shoulder Exam     Tenderness   The patient is experiencing no tenderness.    Range of Motion   Active abduction:  normal   Passive abduction:  normal   Extension:  normal   External rotation:  normal   Forward flexion:  normal   Internal rotation 0 degrees:  normal   Internal rotation 90 degrees:  normal     Muscle Strength   Abduction: 5/5   Internal rotation: 5/5   External rotation: 5/5   Supraspinatus: 5/5   Biceps: 5/5     Tests   Cary test: positive  Impingement: positive    Other   Erythema: absent  Sensation: normal  Pulse: present    Comments:  Impingement testing is still positive but her range of motion and strength testing have dramatically improved.      Left Shoulder Exam   Left shoulder exam is normal.           Image Results:  No new imaging today      Assessment/Plan   Encounter Diagnoses:  Adhesive capsulitis of right shoulder    Rotator cuff impingement syndrome, right    No orders of the defined types were placed in this encounter.    No follow-ups on file.    We discussed her treatment options and agreed that she is doing very well overall.  Her strength and range of motion have improved and she is about 80% better compared to prior to the injection.  No indication for subacromial injection and the patient can follow-up with me as needed moving forward.  She will continue her physical therapy.  Of note she also wants to come back in for me to evaluate some pain that she is having at the base of her right thumb.  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  She is complaining of similar symptoms on the right.  Will likely follow-up with her next week.    ** Please excuse any errors in grammar or translation related to this dictation. Voice recognition software was utilized to prepare this document. **       Almas Hoffman MD  [facility]  Sports Medicine

## 2024-02-07 ENCOUNTER — TELEPHONE (OUTPATIENT)
Dept: PRIMARY CARE | Facility: CLINIC | Age: 54
End: 2024-02-07
Payer: COMMERCIAL

## 2024-02-07 NOTE — PROGRESS NOTES
"Called to check in on pt.  States she is seeing her counselor tomorrow.  Still having nightmares, but is hoping that the counselor can help- \"I still have a long way\" but states that she has really good days and really bad days still.  Medication has been going well and no concerns noted.  Will follow up in 1 month to check in on med management and update on counseling.  Appt scheduled for 3/5/24 with TOÑO.  "

## 2024-02-08 ENCOUNTER — APPOINTMENT (OUTPATIENT)
Dept: PHYSICAL THERAPY | Facility: CLINIC | Age: 54
End: 2024-02-08
Payer: COMMERCIAL

## 2024-02-13 ENCOUNTER — APPOINTMENT (OUTPATIENT)
Dept: ORTHOPEDIC SURGERY | Facility: CLINIC | Age: 54
End: 2024-02-13
Payer: COMMERCIAL

## 2024-02-15 ENCOUNTER — APPOINTMENT (OUTPATIENT)
Dept: PRIMARY CARE | Facility: CLINIC | Age: 54
End: 2024-02-15
Payer: COMMERCIAL

## 2024-02-15 DIAGNOSIS — D50.9 IRON DEFICIENCY ANEMIA, UNSPECIFIED IRON DEFICIENCY ANEMIA TYPE: ICD-10-CM

## 2024-02-15 DIAGNOSIS — D64.9 ANEMIA, UNSPECIFIED TYPE: Primary | ICD-10-CM

## 2024-02-16 RX ORDER — FOLIC ACID AND CYANOCOBALAMIN 1; 500 MG/1; UG/1
1 TABLET, FILM COATED ORAL
Qty: 90 TABLET | Refills: 1 | Status: CANCELLED | OUTPATIENT
Start: 2024-02-16

## 2024-02-27 ENCOUNTER — APPOINTMENT (OUTPATIENT)
Dept: ORTHOPEDIC SURGERY | Facility: CLINIC | Age: 54
End: 2024-02-27
Payer: COMMERCIAL

## 2024-03-01 DIAGNOSIS — M79.672 FOOT PAIN, LEFT: ICD-10-CM

## 2024-03-05 ENCOUNTER — SOCIAL WORK (OUTPATIENT)
Dept: PRIMARY CARE | Facility: CLINIC | Age: 54
End: 2024-03-05
Payer: COMMERCIAL

## 2024-03-05 ENCOUNTER — APPOINTMENT (OUTPATIENT)
Dept: ORTHOPEDIC SURGERY | Facility: CLINIC | Age: 54
End: 2024-03-05
Payer: COMMERCIAL

## 2024-03-05 ENCOUNTER — OFFICE VISIT (OUTPATIENT)
Dept: ORTHOPEDIC SURGERY | Facility: CLINIC | Age: 54
End: 2024-03-05
Payer: COMMERCIAL

## 2024-03-05 ENCOUNTER — HOSPITAL ENCOUNTER (OUTPATIENT)
Dept: RADIOLOGY | Facility: HOSPITAL | Age: 54
Discharge: HOME | End: 2024-03-05
Payer: COMMERCIAL

## 2024-03-05 VITALS — WEIGHT: 277 LBS | HEIGHT: 68 IN | BODY MASS INDEX: 41.98 KG/M2

## 2024-03-05 DIAGNOSIS — F43.12 CHRONIC POST-TRAUMATIC STRESS DISORDER: Primary | ICD-10-CM

## 2024-03-05 DIAGNOSIS — M79.672 FOOT PAIN, LEFT: ICD-10-CM

## 2024-03-05 DIAGNOSIS — M84.375A METATARSAL STRESS FRACTURE OF LEFT FOOT, INITIAL ENCOUNTER: Primary | ICD-10-CM

## 2024-03-05 PROCEDURE — 3008F BODY MASS INDEX DOCD: CPT | Performed by: SPECIALIST

## 2024-03-05 PROCEDURE — 73630 X-RAY EXAM OF FOOT: CPT | Mod: LEFT SIDE | Performed by: RADIOLOGY

## 2024-03-05 PROCEDURE — 73630 X-RAY EXAM OF FOOT: CPT | Mod: LT

## 2024-03-05 PROCEDURE — 99214 OFFICE O/P EST MOD 30 MIN: CPT | Performed by: SPECIALIST

## 2024-03-05 PROCEDURE — 1036F TOBACCO NON-USER: CPT | Performed by: SPECIALIST

## 2024-03-05 ASSESSMENT — PATIENT HEALTH QUESTIONNAIRE - PHQ9
SUM OF ALL RESPONSES TO PHQ QUESTIONS 1-9: 9
4. FEELING TIRED OR HAVING LITTLE ENERGY: SEVERAL DAYS
6. FEELING BAD ABOUT YOURSELF - OR THAT YOU ARE A FAILURE OR HAVE LET YOURSELF OR YOUR FAMILY DOWN: MORE THAN HALF THE DAYS
2. FEELING DOWN, DEPRESSED OR HOPELESS: MORE THAN HALF THE DAYS
8. MOVING OR SPEAKING SO SLOWLY THAT OTHER PEOPLE COULD HAVE NOTICED. OR THE OPPOSITE, BEING SO FIGETY OR RESTLESS THAT YOU HAVE BEEN MOVING AROUND A LOT MORE THAN USUAL: NOT AT ALL
9. THOUGHTS THAT YOU WOULD BE BETTER OFF DEAD, OR OF HURTING YOURSELF: NOT AT ALL
SUM OF ALL RESPONSES TO PHQ9 QUESTIONS 1 & 2: 3
1. LITTLE INTEREST OR PLEASURE IN DOING THINGS: SEVERAL DAYS
7. TROUBLE CONCENTRATING ON THINGS, SUCH AS READING THE NEWSPAPER OR WATCHING TELEVISION: SEVERAL DAYS
10. IF YOU CHECKED OFF ANY PROBLEMS, HOW DIFFICULT HAVE THESE PROBLEMS MADE IT FOR YOU TO DO YOUR WORK, TAKE CARE OF THINGS AT HOME, OR GET ALONG WITH OTHER PEOPLE: SOMEWHAT DIFFICULT
5. POOR APPETITE OR OVEREATING: SEVERAL DAYS
3. TROUBLE FALLING OR STAYING ASLEEP: SEVERAL DAYS

## 2024-03-05 ASSESSMENT — ANXIETY QUESTIONNAIRES
2. NOT BEING ABLE TO STOP OR CONTROL WORRYING: MORE THAN HALF THE DAYS
3. WORRYING TOO MUCH ABOUT DIFFERENT THINGS: MORE THAN HALF THE DAYS
1. FEELING NERVOUS, ANXIOUS, OR ON EDGE: MORE THAN HALF THE DAYS
IF YOU CHECKED OFF ANY PROBLEMS ON THIS QUESTIONNAIRE, HOW DIFFICULT HAVE THESE PROBLEMS MADE IT FOR YOU TO DO YOUR WORK, TAKE CARE OF THINGS AT HOME, OR GET ALONG WITH OTHER PEOPLE: SOMEWHAT DIFFICULT
5. BEING SO RESTLESS THAT IT IS HARD TO SIT STILL: NOT AT ALL
6. BECOMING EASILY ANNOYED OR IRRITABLE: NEARLY EVERY DAY
7. FEELING AFRAID AS IF SOMETHING AWFUL MIGHT HAPPEN: NEARLY EVERY DAY
GAD7 TOTAL SCORE: 13
4. TROUBLE RELAXING: SEVERAL DAYS

## 2024-03-05 NOTE — PROGRESS NOTES
"Collaborative Care (CoCM)  Progress Note    Type of Interaction: Virtual    Start Time: 11:06 AM    End Time: 11:36 AM    Appointment: Scheduled    Reason for Visit:   Chief Complaint   Patient presents with    Follow-up       Interval History / Patient Symptoms:   Subjective   Charo Linder is a 53 y.o. female who presents for follow up for Collaborative Care services.     Patient Health Questionnaire-9 Score: 9 (3/5/2024 11:00 AM)  ELLYN-7 Total Score: 13 (3/5/2024 11:00 AM)    Interventions Provided: CBT, motivational interviewing    Progress Made: Moderate    Response to Intervention:   We discussed the following elements from the last appointment:    Seeing counselor- going to talk about plan for moving forward when they meet Thursday; nightmares have decreased, but struggling now with falling and staying asleep.  Wakes at 5am and usually gets around 4-5 hrs/night, even with trazodone- also tried taking zquill.  \"Mind won't rest\".  Discussed situational stressors that may be affecting her sleep patterns- particularly since she has had higher stress and change with researching and applying to jobs, working out finances, and working through trauma with her therapist.  Is also saving up to move out of her brother's house- his own mental health is bringing her down when trying to make changes.  Has been walking and getting out more- 15 minute walks  Feels Lexapro 15mg is working well.  Unsure if sleep meds should be changed    Plan:   Continue to attend therapy appointments  Follow up in 1 month to check in  [facility] to message PCP re: sleeping meds    Follow Up / Next Appointment: Next appointment: 04/02/24    "

## 2024-03-05 NOTE — PROGRESS NOTES
8 foot swelling/pain for 3 weeks.   No injury  States the top of her foot hurts, ices daily. Taking Meloxicam and Tylenol for pain.   Some redness.     Xrays done     Exam: Mild swelling minimal erythema dorsal midfoot forefoot on the right.  Pain isolated to primarily the second and third metatarsal neck regions dorsally.  Otherwise dermis intact neurovascular intact slightly tight gastrocs bilateral.  She has a subtle pes cavus.  But no other acute findings on exam.    Radiographs: 3 view standing right foot shows no identifiable abnormalities at this stage, with a subtle cavus foot type.  1 view of the contralateral right foot shows a healed fourth metatarsal stress fracture.    Assessment/plan: Early stress reaction/stress fracture right foot second metatarsal.  Some of this is likely transfer stress from the first ray.  Also she overloads the lateral foot with a subtle pes cavus.  She needs to aggressively stretch her Achilles.  I gave her prescription for custom foot orthotics and metatarsal pads.  If pain persists follow-up with repeat x-rays.  Recommend low impact fitness for for weight loss.

## 2024-03-06 NOTE — CONSULTS
Service:   Service: Medicine     Consult:  Consult requested by (Attending Name): Isidro Daugherty   Reason: Hospitalist/Teaching Service or PCP for Medical  Management     History of Present Illness:   Admission Reason: Revision of loose left total knee   HPI:    SHIVAM MONAE is a 53 year old Female with a past medical history of mitral valve prolapse asthma GERD anxiety morbid obesity seen postoperatively after patient  underwent revision of her left total knee.  Patient was complaining of some severe left knee pain and received some morphine.  She is denying any nausea vomiting shortness of breath chest pains palpitation numbness or tingling.  Current vital signs show  111/71 pulse of 81 respirations 14 patient has room air pulse ox of 98%    Past surgical history:  left total knee, T&A  Family medical history: Diabetes mellitus pulmonary emboli  Social history: Non-smoker nondrinker denies any illicit drug use    Review Family/Social History and ROS:     Review Family/Social History and ROS:    See history of present illness    Social History:    Smoking Status: never smoker  (1)   Alcohol Use: occasionally (1)   Drug Use: denies  (1)   Drug 2 Use: denies  (1)     Constitutional: NEGATIVE: Fever, Chills, Anorexia,  Weight Loss, Malaise     Eyes: NEGATIVE: Blurry Vision, Drainage, Diploplia,  Redness, Vision Loss/ Change     ENMT: NEGATIVE: Nasal Discharge, Nasal Congestion,  Ear Pain, Mouth Pain, Throat Pain     Respiratory: NEGATIVE: Dry Cough, Productive Cough,  Hemoptysis, Wheezing, Shortness of Breath     Cardiac: NEGATIVE: Chest Pain, Dyspnea on Exertion,  Orthopnea, Palpitations, Syncope     Gastrointestinal: NEGATIVE: Nausea, Vomiting, Diarrhea,  Constipation, Abdominal Pain     Genitourinary: NEGATIVE: Discharge, Dysuria, Flank  Pain, Frequency, Hematuria     Musculoskeletal: POSITIVE: Pain; NEGATIVE: Decreased  ROM, Swelling, Stiffness, Weakness; COMMENTS: Left knee postoperative pain      Neurological: NEGATIVE: Dizziness, Confusion, Headache,  Seizures, Syncope     Psychiatric: NEGATIVE: Mood Changes, Anxiety, Hallucinations,  Sleep Changes, Suicidal Ideas     Skin: NEGATIVE: Mass, Pain, Pruritus, Rash, Ulcer     Endocrine: NEGATIVE: Heat Intolerance, Cold Intolerance,  Sweat, Polyuria, Thirst     Hematologic/Lymph: NEGATIVE: Anemia, Bruising, Easy  Bleeding, Night Sweats, Petechiae              Allergies:  ·  erythromycin : Unknown       Intolerances:  ·  codeine : Unknown    Objective:     Objective Information:        T   P  R  BP   MAP  SpO2   Value  36.2  72  16  100/63      97%  Date/Time 5/3 22:40 5/3 22:40 5/3 22:40 5/3 22:40    5/3 22:40  Range  (36.1C - 36.5C )  (72 - 84 )  (14 - 16 )  (100 - 122 )/ (63 - 76 )    (92% - 98% )   As of 03-May-2023 20:27:00, patient is on 2 L/min of oxygen via nasal cannula.         Weights   5/3 18:36: Weight in kg (Weight (kg))  116.8  5/3 18:36: Weight in lbs ((lbs))  257.4  5/3 18:36: BMI (kg/m2) (BMI (kg/m2))  41.68    Physical Exam by System:    Constitutional: Well developed, awake/alert/oriented  x3, no distress, alert and cooperative   Eyes: PERRL, EOMI, clear sclera   ENMT: mucous membranes moist, no apparent injury,  no lesions seen   Head/Neck: Neck supple, no apparent injury, thyroid  without mass or tenderness, No JVD, trachea midline, no bruits   Respiratory/Thorax: Patent airways, CTAB, normal  breath sounds with good chest expansion, thorax symmetric   Cardiovascular: Regular patient has grade 2 systolic  murmur no gallop no peripheral edema   Gastrointestinal: Nondistended, soft, non-tender,  no rebound tenderness or guarding, no masses palpable, no organomegaly, +BS, no bruits   Genitourinary: No flank tenderness   Musculoskeletal: ROM intact, no joint swelling, normal  strength  Left knee wrapped   Extremities: normal extremities, no cyanosis edema,  contusions or wounds, no clubbing   Neurological: alert and oriented x3, intact  senses,  motor, response and reflexes, normal strength   Lymphatic: No significant lymphadenopathy   Psychological: Appropriate mood and behavior   Skin: Warm and dry, no lesions, no rashes     Medications:    Medications:          Continuous Medications       --------------------------------    1. Lactated Ringers Infusion:  1000  mL  IntraVenous  <Continuous>    2. Lactated Ringers Infusion:  1000  mL  IntraVenous  <Continuous>         Scheduled Medications       --------------------------------    1. Acetaminophen:  975  mg  Oral  Every 8 Hours    2. Albuterol 2.5 mg/ 3 mL Nebulizer Soln:  3  mL  Inhalation  Every 6 Hours    3. Aspirin Enteric Coated:  81  mg  Oral  2 Times a Day    4. ceFAZolin 2 gram/ D5W 100 mL Premix IVPB:  100  mL  IntraVenous Piggyback  Every 8 Hours    5. Docusate:  100  mg  Oral  2 Times a Day    6. Escitalopram:  10  mg  Oral  Daily    7. Fluticasone 100 microgram - Vilanterol 25 microgram/ Inh:  1  inhalation  Inhalation  Every 24 Hours    8. Pantoprazole:  40  mg  Oral  Daily    9. Polyethylene Glycol:  17  gram(s)  Oral  2 Times a Day    10. Tiotropium Respimat 2.5 microgram/ Inhalation MDI:  2  inhalation  Inhalation  Every 24 Hours    11. traZODone:  50  mg  Oral  At Bedtime         PRN Medications       --------------------------------    1. Cyclobenzaprine:  10  mg  Oral  3 Times a Day    2. diphenhydrAMINE:  25  mg  Oral  Every 6 Hours    3. HYDROmorphone Injectable:  0.4  mg  IntraVenous Push  Every 2 Hours    4. Ketorolac Injectable:  15  mg  IntraVenous Push  Every 6 Hours    5. Magnesium Hydroxide -Al Hydrox -Simethicone Oral Liquid:  30  mL  Oral  Every 6 Hours    6. Ondansetron Injectable:  4  mg  IntraVenous Push  Every 6 Hours    7. oxyCODONE Immediate Release:  5  mg  Oral  Every 4 Hours    8. oxyCODONE Immediate Release:  10  mg  Oral  Every 4 Hours        Recent Lab Results:    Results:        I have reviewed these laboratory results: Coronavirus 2019, Screen  "Asymptomatic [Drawn 01-May-2023 13:11:00].      Consult Status:  Consult Order ID: [id number]     Problem/Assessment/Plan:    Impression 1: Status post left total knee revision   Plan for Impression 1: Pain control.  Therapy as  per orthopedics.  DVT prophylaxis recommended as aspirin 81 mg twice daily   Impression 2: Mitral valve regurg   Plan for Impression 2: Patient well compensated at  present time continue blood pressure control   Impression 3: Reactive airway disease   Plan for Impression 3: Resume home as needed albuterol   Impression 4: Recent UTI   Plan for Impression 4: Repeat urinalysis   Impression 5: GERD   Plan for Impression 5: On PPI       Electronic Signatures:  Epi Friedman)  (Signed 03-May-2023 23:01)   Authored: Service, History of Present Illness, Review  Family/Social History and ROS, Allergies, Objective, Assessment/Recommendations, Note Completion      Last Updated: 03-May-2023 23:01 by Epi Friedman)    References:  1.  Data Referenced From \"Patient Profile - Adult v2\" 03-May-2023 18:36   "

## 2024-03-18 DIAGNOSIS — M79.671 RIGHT FOOT PAIN: ICD-10-CM

## 2024-03-19 RX ORDER — MELOXICAM 15 MG/1
15 TABLET ORAL DAILY
Qty: 30 TABLET | Refills: 0 | Status: SHIPPED | OUTPATIENT
Start: 2024-03-19 | End: 2024-04-18

## 2024-03-20 ENCOUNTER — PATIENT MESSAGE (OUTPATIENT)
Dept: PRIMARY CARE | Facility: CLINIC | Age: 54
End: 2024-03-20
Payer: COMMERCIAL

## 2024-03-20 DIAGNOSIS — F41.8 ANXIETY ASSOCIATED WITH DEPRESSION: ICD-10-CM

## 2024-03-20 RX ORDER — ESCITALOPRAM OXALATE 10 MG/1
15 TABLET ORAL DAILY
Qty: 45 TABLET | Refills: 1 | Status: SHIPPED | OUTPATIENT
Start: 2024-03-20

## 2024-03-29 ENCOUNTER — DOCUMENTATION (OUTPATIENT)
Dept: PRIMARY CARE | Facility: CLINIC | Age: 54
End: 2024-03-29
Payer: COMMERCIAL

## 2024-03-29 DIAGNOSIS — F43.12 CHRONIC POST-TRAUMATIC STRESS DISORDER: Primary | ICD-10-CM

## 2024-03-29 PROCEDURE — G2214 INIT/SUB PSYCH CARE M 1ST 30: HCPCS | Performed by: FAMILY MEDICINE

## 2024-04-02 ENCOUNTER — APPOINTMENT (OUTPATIENT)
Dept: PRIMARY CARE | Facility: CLINIC | Age: 54
End: 2024-04-02
Payer: COMMERCIAL

## 2024-04-09 ENCOUNTER — SOCIAL WORK (OUTPATIENT)
Dept: PRIMARY CARE | Facility: CLINIC | Age: 54
End: 2024-04-09
Payer: COMMERCIAL

## 2024-04-09 DIAGNOSIS — F43.12 CHRONIC POST-TRAUMATIC STRESS DISORDER: Primary | ICD-10-CM

## 2024-04-09 ASSESSMENT — ANXIETY QUESTIONNAIRES
3. WORRYING TOO MUCH ABOUT DIFFERENT THINGS: SEVERAL DAYS
5. BEING SO RESTLESS THAT IT IS HARD TO SIT STILL: NOT AT ALL
4. TROUBLE RELAXING: SEVERAL DAYS
1. FEELING NERVOUS, ANXIOUS, OR ON EDGE: MORE THAN HALF THE DAYS
6. BECOMING EASILY ANNOYED OR IRRITABLE: SEVERAL DAYS
7. FEELING AFRAID AS IF SOMETHING AWFUL MIGHT HAPPEN: NEARLY EVERY DAY
GAD7 TOTAL SCORE: 10
IF YOU CHECKED OFF ANY PROBLEMS ON THIS QUESTIONNAIRE, HOW DIFFICULT HAVE THESE PROBLEMS MADE IT FOR YOU TO DO YOUR WORK, TAKE CARE OF THINGS AT HOME, OR GET ALONG WITH OTHER PEOPLE: VERY DIFFICULT
2. NOT BEING ABLE TO STOP OR CONTROL WORRYING: MORE THAN HALF THE DAYS

## 2024-04-09 ASSESSMENT — PATIENT HEALTH QUESTIONNAIRE - PHQ9
5. POOR APPETITE OR OVEREATING: MORE THAN HALF THE DAYS
SUM OF ALL RESPONSES TO PHQ9 QUESTIONS 1 & 2: 3
2. FEELING DOWN, DEPRESSED OR HOPELESS: SEVERAL DAYS
8. MOVING OR SPEAKING SO SLOWLY THAT OTHER PEOPLE COULD HAVE NOTICED. OR THE OPPOSITE, BEING SO FIGETY OR RESTLESS THAT YOU HAVE BEEN MOVING AROUND A LOT MORE THAN USUAL: NOT AT ALL
1. LITTLE INTEREST OR PLEASURE IN DOING THINGS: MORE THAN HALF THE DAYS
3. TROUBLE FALLING OR STAYING ASLEEP: NEARLY EVERY DAY
4. FEELING TIRED OR HAVING LITTLE ENERGY: SEVERAL DAYS
9. THOUGHTS THAT YOU WOULD BE BETTER OFF DEAD, OR OF HURTING YOURSELF: NOT AT ALL
SUM OF ALL RESPONSES TO PHQ QUESTIONS 1-9: 12
7. TROUBLE CONCENTRATING ON THINGS, SUCH AS READING THE NEWSPAPER OR WATCHING TELEVISION: SEVERAL DAYS
10. IF YOU CHECKED OFF ANY PROBLEMS, HOW DIFFICULT HAVE THESE PROBLEMS MADE IT FOR YOU TO DO YOUR WORK, TAKE CARE OF THINGS AT HOME, OR GET ALONG WITH OTHER PEOPLE: SOMEWHAT DIFFICULT
6. FEELING BAD ABOUT YOURSELF - OR THAT YOU ARE A FAILURE OR HAVE LET YOURSELF OR YOUR FAMILY DOWN: MORE THAN HALF THE DAYS

## 2024-04-09 NOTE — PROGRESS NOTES
Collaborative Care (CoCM)  Progress Note    Type of Interaction: Virtual    Start Time: 10:03 AM    End Time: 10:33 AM    Appointment: Scheduled    Reason for Visit:   Chief Complaint   Patient presents with    Follow-up        Interval History / Patient Symptoms:   Subjective   Charo Linder is a 53 y.o. female who presents for follow up for Collaborative Care services.     ELLYN and PHQ screeners sent to pt through [employer] immediately after appointment to complete.  PHQ: 12 (increased from last appt)  ELLYN: 10 (decreased from last appt)    Interventions Provided: Review Progress; treatment needs    Progress Made: Slight    Response to Intervention:   We discussed the following elements from the last appointment:    EMDR- starting next week with therapist; has a plan for treatment- ways to handle things, like triggers and ways to slow reactions.  Work going well right now- likes her new manager and wants to see how things go before making any decisions, less negativity, feels heard; giving til July and then will reevaluate; at work, also using growth vs fixed mindset- feels like her mindset has been fixed, and has been working on using a growth mindset- grounding self, praying for patience, using strategies to help manage difficult customers.    Reports stressors of her dad's dementia getting worse- has taken mom to support group and siblings have been working together to support their parents    Medication: Long term goal- decrease medication  Doesn't feel angry all the time, better able to control difficult emotions  Sleep medication- trying different things instead of sleep meds; count sheep- gets to 5-6 and then dozes off.  Sleep is still hit or miss, but slept well last night; trying to be outside more often, better nutrition and exercise    Long term goals:[place] with triggers, forgive him, get off of as many medications as possible;   Hoping to make appointments tomorrow for other specialists to start to  work towards her goals.    Plan:   Follow up in 2 months with M, per patient request. Relapse prevention  Continue with EMDR therapy sessions to work towards long term goals.      Follow Up / Next Appointment: Next appointment: 06/11/24

## 2024-04-30 ENCOUNTER — DOCUMENTATION (OUTPATIENT)
Dept: PRIMARY CARE | Facility: CLINIC | Age: 54
End: 2024-04-30
Payer: COMMERCIAL

## 2024-04-30 DIAGNOSIS — F43.12 CHRONIC POST-TRAUMATIC STRESS DISORDER: Primary | ICD-10-CM

## 2024-04-30 PROCEDURE — G2214 INIT/SUB PSYCH CARE M 1ST 30: HCPCS | Performed by: FAMILY MEDICINE

## 2024-05-06 ENCOUNTER — OFFICE VISIT (OUTPATIENT)
Dept: ORTHOPEDIC SURGERY | Facility: CLINIC | Age: 54
End: 2024-05-06
Payer: COMMERCIAL

## 2024-05-06 ENCOUNTER — HOSPITAL ENCOUNTER (OUTPATIENT)
Dept: RADIOLOGY | Facility: CLINIC | Age: 54
Discharge: HOME | End: 2024-05-06
Payer: COMMERCIAL

## 2024-05-06 VITALS — WEIGHT: 277 LBS | HEIGHT: 68 IN | BODY MASS INDEX: 41.98 KG/M2

## 2024-05-06 DIAGNOSIS — Z96.652 S/P REVISION OF TOTAL KNEE, LEFT: Primary | ICD-10-CM

## 2024-05-06 DIAGNOSIS — M25.562 LEFT KNEE PAIN, UNSPECIFIED CHRONICITY: ICD-10-CM

## 2024-05-06 DIAGNOSIS — S99.191A OTHER PHYSEAL FRACTURE OF RIGHT METATARSAL, INITIAL ENCOUNTER FOR CLOSED FRACTURE: ICD-10-CM

## 2024-05-06 PROCEDURE — 3008F BODY MASS INDEX DOCD: CPT | Performed by: ORTHOPAEDIC SURGERY

## 2024-05-06 PROCEDURE — 73560 X-RAY EXAM OF KNEE 1 OR 2: CPT | Mod: LT

## 2024-05-06 PROCEDURE — 73560 X-RAY EXAM OF KNEE 1 OR 2: CPT | Mod: LEFT SIDE | Performed by: RADIOLOGY

## 2024-05-06 PROCEDURE — 99213 OFFICE O/P EST LOW 20 MIN: CPT | Performed by: ORTHOPAEDIC SURGERY

## 2024-05-06 ASSESSMENT — PAIN - FUNCTIONAL ASSESSMENT: PAIN_FUNCTIONAL_ASSESSMENT: NO/DENIES PAIN

## 2024-05-06 NOTE — PROGRESS NOTES
ORTHOPEDIC TOTAL JOINT  POST-OPERATIVE FOLLOW UP      ============================  IMPRESSION/PLAN:  ============================  54 y.o. female s/p Left Knee Revision Arthroplasty completed on 05/03/2023.    PLAN:  Patient is 1 year out from her left knee revision doing well at this time.  Continue to discuss working on weight loss and lower extremity strengthening to effectively treat her current conditions.  She has no issues noted on her current x-rays and these were reviewed with her in the office today.  At this point allow her to progress to all activities as tolerated and follow-up as needed with me.        Charo BISHOP Niyah presents today for follow up of joint replacement above. Pain controlled with current treatment plan. She comes in today for her one year visit doing good.  She states if she is on her feet for a long time her knee feels weak but otherwise she is doing well.     Review of Systems:   Constitutional: See HPI for pain assessment, No significant weight loss, recent trauma. Denies fevers/chills  Cardiovascular: No chest pain, shortness of breath  Respiratory: No difficulty breathing, cough  Gastrointestinal: No nausea, vomiting, diarrhea, constipation  Musculoskeletal: Noted in HPI, no arthralgias   Integumentary: No rashes, easy bruising, redness   Neurological: no numbness or tingling in extremities, no gait disturbances     Patient Active Problem List   Diagnosis    Acute left-sided low back pain without sciatica    Allergic asthma (HHS-HCC)    Anemia    Anxiety associated with depression    Carpal tunnel syndrome of right wrist    Costochondritis    Dysfunction of both eustachian tubes    Elevated glucose    GERD (gastroesophageal reflux disease)    Hip pain, acute, left    Insomnia    Migraine headache    Moderate persistent asthma with acute exacerbation (HHS-HCC)    Morbid obesity (Multi)    Prediabetes    Sacroiliitis (CMS-HCC)    Tendinitis of right foot    Tendinitis of left hand     Vitamin D deficiency disease    Class 3 drug-induced obesity with body mass index (BMI) of 40.0 to 44.9 in adult (Multi)    Chronic post-traumatic stress disorder    Decreased ROM of right shoulder    Shoulder weakness    Adhesive capsulitis of right shoulder       =================================  EXAM  =================================  GENERAL: A/Ox3, NAD. Appears healthy, well nourished  CARDIAC: regular rate  LUNGS: Breathing non-labored    MUSCULOSKELETAL:  Laterality: left knee  - Incision: Well-healed   - ROM: 0 to 120 degrees  - Palpation: appropriate post operative TTP along surgical incision  - compartments soft, negative homans  - can active straight leg raise with no extensor lag    NEUROVASCULAR:  - Neurovascular Status: sensation intact to light touch distally  - Capillary refill brisk at extremities, Bilateral dorsalis pedis pulse 2+     IMAGING: Multi views left knee reviewed which demonstrates no signs of loosening or failure of left knee revision arthroplasty. X-rays were personally reviewed by me.  Radiology reports were reviewed by me as well, if available at the time.        Isidro Daugherty DO  Attending Surgeon  Joint Replacement and Adult Reconstructive Surgery  [place], [place]

## 2024-05-12 DIAGNOSIS — J45.909 UNSPECIFIED ASTHMA, UNCOMPLICATED (HHS-HCC): ICD-10-CM

## 2024-06-01 RX ORDER — ALBUTEROL SULFATE 90 UG/1
2 AEROSOL, METERED RESPIRATORY (INHALATION) EVERY 4 HOURS PRN
Qty: 18 G | Refills: 0 | Status: SHIPPED | OUTPATIENT
Start: 2024-06-01

## 2024-06-11 ENCOUNTER — APPOINTMENT (OUTPATIENT)
Dept: PRIMARY CARE | Facility: CLINIC | Age: 54
End: 2024-06-11
Payer: COMMERCIAL

## 2024-06-18 ENCOUNTER — TELEPHONE (OUTPATIENT)
Dept: PRIMARY CARE | Facility: CLINIC | Age: 54
End: 2024-06-18
Payer: COMMERCIAL

## 2024-06-28 ENCOUNTER — TELEPHONE (OUTPATIENT)
Dept: PRIMARY CARE | Facility: CLINIC | Age: 54
End: 2024-06-28
Payer: COMMERCIAL

## 2024-07-05 DIAGNOSIS — S92.341D: ICD-10-CM

## 2024-07-09 ENCOUNTER — APPOINTMENT (OUTPATIENT)
Dept: ORTHOPEDIC SURGERY | Facility: CLINIC | Age: 54
End: 2024-07-09
Payer: COMMERCIAL

## 2024-07-09 ENCOUNTER — TELEPHONE (OUTPATIENT)
Dept: PRIMARY CARE | Facility: CLINIC | Age: 54
End: 2024-07-09

## 2024-07-09 ENCOUNTER — HOSPITAL ENCOUNTER (OUTPATIENT)
Dept: RADIOLOGY | Facility: HOSPITAL | Age: 54
Discharge: HOME | End: 2024-07-09
Payer: COMMERCIAL

## 2024-07-09 VITALS — WEIGHT: 277 LBS | HEIGHT: 68 IN | BODY MASS INDEX: 41.98 KG/M2

## 2024-07-09 DIAGNOSIS — S92.341D: Primary | ICD-10-CM

## 2024-07-09 DIAGNOSIS — F43.12 CHRONIC POST-TRAUMATIC STRESS DISORDER: Primary | ICD-10-CM

## 2024-07-09 DIAGNOSIS — S92.341D: ICD-10-CM

## 2024-07-09 PROCEDURE — 99214 OFFICE O/P EST MOD 30 MIN: CPT | Performed by: SPECIALIST

## 2024-07-09 PROCEDURE — 3008F BODY MASS INDEX DOCD: CPT | Performed by: SPECIALIST

## 2024-07-09 PROCEDURE — 73630 X-RAY EXAM OF FOOT: CPT | Mod: RT

## 2024-07-09 NOTE — PROGRESS NOTES
EPV right foot pain, new painful lump  Previous   Closed fracture of fourth metatarsal bone of right foot  now has a visible lump on the outside of right foot which is causing severe pain with burning and stinging. No knew injury to foot that she's aware of.  Xrays dne today   No recent new injury to the foot.  She was ordered custom foot orthotics for her cavus foot type that have not been obtained yet.    Exam: Right foot in stance once again has a mild pes cavus/hindfoot varus.  She does have prominence over the base of the fifth metatarsal and then to the point of her discomfort.  Stress testing of the ankle and midfoot is stable.  Dermis intact neurovascular intact minimal pain to palpation of the prominence at the base of the fifth.    Radiographs: 3 view standing right foot, identifies increased bone density at the base of the fourth and fifth metatarsal.  No other acute change.    Assessment plan: Mild pes cavus with healed fourth and possible fifth metatarsal stress fractures.  The bone healing involved increased bone prominence to the region.  This can be accommodated by her custom foot orthotics which have yet to be made.  Follow-up if no improvement but anticipate bone remodeling over the course of the year.

## 2024-07-09 NOTE — PROGRESS NOTES
"Called and spoke with patient; patient is \"doing better\"- has been meeting with her counselor every other week and has been helping a lot.    Medications are stable and mood is stable- though still some days are better than others.  Closing collaborative care services at this time; patient is aware that service can be re-opened in the future if needed.        "

## 2024-08-20 ENCOUNTER — APPOINTMENT (OUTPATIENT)
Dept: ORTHOPEDIC SURGERY | Facility: CLINIC | Age: 54
End: 2024-08-20
Payer: COMMERCIAL

## 2024-08-20 ENCOUNTER — HOSPITAL ENCOUNTER (OUTPATIENT)
Dept: RADIOLOGY | Facility: EXTERNAL LOCATION | Age: 54
Discharge: HOME | End: 2024-08-20

## 2024-08-20 ENCOUNTER — HOSPITAL ENCOUNTER (OUTPATIENT)
Dept: RADIOLOGY | Facility: CLINIC | Age: 54
Discharge: HOME | End: 2024-08-20
Payer: COMMERCIAL

## 2024-08-20 VITALS — WEIGHT: 277 LBS | HEIGHT: 68 IN | BODY MASS INDEX: 41.98 KG/M2

## 2024-08-20 DIAGNOSIS — M25.531 RIGHT WRIST PAIN: ICD-10-CM

## 2024-08-20 DIAGNOSIS — M18.11 PRIMARY OSTEOARTHRITIS OF FIRST CARPOMETACARPAL JOINT OF RIGHT HAND: Primary | ICD-10-CM

## 2024-08-20 PROCEDURE — 73110 X-RAY EXAM OF WRIST: CPT | Mod: RT

## 2024-08-20 PROCEDURE — 73110 X-RAY EXAM OF WRIST: CPT | Mod: RIGHT SIDE | Performed by: RADIOLOGY

## 2024-08-20 PROCEDURE — 3008F BODY MASS INDEX DOCD: CPT | Performed by: EMERGENCY MEDICINE

## 2024-08-20 PROCEDURE — L3924 HFO WITHOUT JOINTS PRE OTS: HCPCS | Performed by: EMERGENCY MEDICINE

## 2024-08-20 PROCEDURE — 20604 DRAIN/INJ JOINT/BURSA W/US: CPT | Performed by: EMERGENCY MEDICINE

## 2024-08-20 PROCEDURE — 99214 OFFICE O/P EST MOD 30 MIN: CPT | Performed by: EMERGENCY MEDICINE

## 2024-08-20 PROCEDURE — 1036F TOBACCO NON-USER: CPT | Performed by: EMERGENCY MEDICINE

## 2024-08-20 RX ORDER — LIDOCAINE HYDROCHLORIDE 10 MG/ML
0.5 INJECTION INFILTRATION; PERINEURAL
Status: COMPLETED | OUTPATIENT
Start: 2024-08-20 | End: 2024-08-20

## 2024-08-20 RX ORDER — TRIAMCINOLONE ACETONIDE 40 MG/ML
40 INJECTION, SUSPENSION INTRA-ARTICULAR; INTRAMUSCULAR
Status: COMPLETED | OUTPATIENT
Start: 2024-08-20 | End: 2024-08-20

## 2024-08-20 ASSESSMENT — PAIN - FUNCTIONAL ASSESSMENT: PAIN_FUNCTIONAL_ASSESSMENT: NO/DENIES PAIN

## 2024-08-20 NOTE — PROGRESS NOTES
Subjective    Patient ID: Charo Linder is a 54 y.o. female.    Chief Complaint: Pain of the Right Wrist (Pain in the thumb that radiates up to the distal radius for 2 years. Had time making a fist or ulnar deviating. Some numbness/tingling in the morning. No Hx of Sx, trauma, or injections. Taking tylenol as needed./Xr today)     Last Surgery: No surgery found  Last Surgery Date: No surgery found    Charo is a very pleasant 53-year-old female coming in with pain in her right shoulder referred here by Dr. Ledezma for a possible cortisone injection into her glenohumeral joint under ultrasound guidance and management of adhesive capsulitis of the right shoulder.  She has been having pain for months but denies any known traumatic mechanism or injuries.  This could be an overuse injury as her pain started gradually and eventually progressed to the point where she has had decreased range of motion.  Her pain is diffusely involving her entire right shoulder but is mostly anterior lateral.  She is now having trouble doing her hair and reaching behind her head.  At night her pain is much worse.  She was recently given a prescription for meloxicam as well as physical therapy and is interested in a cortisone shot here today. We agreed for her to continue with physical therapy, home exercises, and her meloxicam.  We also agreed to perform an ultrasound-guided cortisone injection of the right glenohumeral joint.  The patient tolerated the procedure well without any complications.  Activity modifications were reviewed.  She will follow-up with me in 4 weeks and if she has not responded well to this treatment plan we could potentially perform a right-sided subacromial injection at that time.    Update on 2/6/2024.  Patient is coming back in for a follow-up visit.  She had a right glenohumeral cortisone injection on 1/9/2024 and states that she is about 80% better.  Her range of motion has improved and she has been able to go  to about 4 physical therapy sessions.  She is sleeping much better and her pain is a mild dull ache. We agreed that she is doing very well overall.  Her strength and range of motion have improved and she is about 80% better compared to prior to the injection.  No indication for subacromial injection and the patient can follow-up with me as needed moving forward.  She will continue her physical therapy.  Of note she also wants to come back in for me to evaluate some pain that she is having at the base of her right thumb.  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  She is complaining of similar symptoms on the right.  Will likely follow-up with her next week.    Update on 8/20/2024.  Charo is now 54 years old and is coming back in for a follow-up visit but this time with a new chief complaint of some right wrist pain at the base of her right thumb.  She denies any known traumatic events or injuries but has had pain for the past several months.  She had been taking meloxicam until recently and once she stopped taking it her pain got worse.  She states that the pain is worse with activity and better with rest and that she is right-hand dominant.  This is starting to inhibit her ability to work.  She works at a local grocery store.  No fevers or chills.  She has never had an injection in her hand or wrist.        Objective   Right Hand Exam     Tenderness   Right hand tenderness location: Tenderness to palpation at the first CMC joint with a positive grind test.    Range of Motion   The patient has normal right wrist ROM.     Muscle Strength   The patient has normal right wrist strength.    Tests   Phalen’s Sign: negative  Tinel's sign (median nerve): negative  Finkelstein's test: negative    Other   Erythema: absent  Sensation: normal  Pulse: present    Comments:  Median nerve provocative testing is unremarkable.  Sensation  to light touch is intact in the median ulnar and radial nerve distributions.  The patient has full strength with thumb flexion, extension, abduction and adduction.      Left Hand Exam   Left hand exam is normal.          Image Results:  X-rays of the right hand were reviewed and interpreted by me on 8/20/2024 and revealed severe DJD at the right first CMC joint.  No evidence of acute injuries or fractures.    Patient ID: Charo Linder is a 54 y.o. female.    Hand / UE Inj/Asp: R thumb CMC for osteoarthritis on 8/20/2024 12:31 PM  Indications: pain  Details: 25 G needle, ultrasound-guided dorsal approach  Medications: 40 mg triamcinolone acetonide 40 mg/mL; 0.5 mL lidocaine 10 mg/mL (1 %)  Outcome: tolerated well, no immediate complications  Procedure, treatment alternatives, risks and benefits explained, specific risks discussed. Consent was given by the patient. Immediately prior to procedure a time out was called to verify the correct patient, procedure, equipment, support staff and site/side marked as required. Patient was prepped and draped in the usual sterile fashion.           Assessment/Plan   Encounter Diagnoses:  Primary osteoarthritis of first carpometacarpal joint of right hand    Right wrist pain    Orders Placed This Encounter    Hand / UE Inj/Asp    XR wrist right 3+ views    Point of Care Ultrasound     No follow-ups on file.    We discussed her treatment options and agreed to perform a right CMC joint cortisone injection at the base of her right thumb under ultrasound guidance.  The patient tolerated the procedure well without any complications and activity modifications were reviewed.  She is also going to begin using prescription dose Voltaren gel topically 3 times daily and was provided with a Comfort Cool brace.  She was also given a work note today in case she needs to go home if her symptoms start to worsen as the lidocaine wears off.  She will follow-up with me as needed moving  forward.    **Patient was prescribed a Comfort Cool brace for [right first CMC joint osteoarthritis]. The patient has weakness, instability and/or deformity of their right hand which requires stabilization from this orthosis to improve their function.       Verbal and written instructions for the use, wear schedule, cleaning and application of this item were given.  Patient was instructed that should the brace result in increased pain, decreased sensation, increased swelling, or an overall worsening of their medical condition, to please contact our office immediately.      Orthotic management and training was provided for skin care, modifications due to healing tissues, edema changes, interruption in skin integrity, and safety precautions with the orthosis.    ** Please excuse any errors in grammar or translation related to this dictation. Voice recognition software was utilized to prepare this document. **       Almas Hoffman MD  [facility]

## 2024-08-23 ENCOUNTER — DOCUMENTATION (OUTPATIENT)
Dept: ORTHOPEDIC SURGERY | Facility: CLINIC | Age: 54
End: 2024-08-23
Payer: COMMERCIAL

## 2024-08-23 NOTE — PROGRESS NOTES
[facility] radiology noting a possible triquetral fracture.  Unlikely given no history of trauma and based on her exam earlier this week.  No change in plan at this time.    ** Please excuse any errors in grammar or translation related to this dictation. Voice recognition software was utilized to prepare this document. **       Almas Hoffman MD  [facility]

## 2024-11-14 DIAGNOSIS — G47.09 OTHER INSOMNIA: ICD-10-CM

## 2024-11-19 RX ORDER — TRAZODONE HYDROCHLORIDE 100 MG/1
100 TABLET ORAL NIGHTLY PRN
Qty: 60 TABLET | Refills: 5 | OUTPATIENT
Start: 2024-11-19

## 2025-01-20 DIAGNOSIS — F41.8 ANXIETY ASSOCIATED WITH DEPRESSION: ICD-10-CM

## 2025-01-20 DIAGNOSIS — K21.9 GASTROESOPHAGEAL REFLUX DISEASE WITHOUT ESOPHAGITIS: ICD-10-CM

## 2025-01-20 NOTE — TELEPHONE ENCOUNTER
Patient was wondering If you can provide her a short supply until she see you on her next appt. According to patient all her medication will hold her until the end of this month didn't want to specify.     Medication Name:buPROPion XL (Wellbutrin XL)   Dose: 300 mg tb   Frequency: Daily  Quantity left: until the end of the month    Medication Name:omeprazole (PriLOSEC)  Dose: 20 mg cap   Frequency: Daily   Quantity left: until the end of the month    Medication Name:escitalopram (Lexapro)  Dose: 15 mg   Frequency: daily   Quantity left: until the end of the month    Medication Name:albuterol  Dose:90 mcg/actuation inhaler  Frequency: every 4 hrs PRN   Quantity left: until the end of the month    Pharmacy:[facility]/pharmacy #[id number] - [place], [place] - [phone] [id number]      Last appointment:1/22/2024  Last CPE:11/24/2023  Last MCW: N/A  Next appointment:2/27/2025  Next CPE:N/A  Next MCW:N/A

## 2025-01-23 RX ORDER — ESCITALOPRAM OXALATE 10 MG/1
15 TABLET ORAL DAILY
Qty: 45 TABLET | Refills: 0 | Status: SHIPPED | OUTPATIENT
Start: 2025-01-23

## 2025-01-23 RX ORDER — BUPROPION HYDROCHLORIDE 300 MG/1
300 TABLET ORAL DAILY
Qty: 30 TABLET | Refills: 0 | Status: SHIPPED | OUTPATIENT
Start: 2025-01-23

## 2025-01-23 RX ORDER — OMEPRAZOLE 20 MG/1
20 CAPSULE, DELAYED RELEASE ORAL DAILY
Qty: 30 CAPSULE | Refills: 0 | Status: SHIPPED | OUTPATIENT
Start: 2025-01-23

## 2025-01-24 NOTE — TELEPHONE ENCOUNTER
Patient is aware 3 medications were refilled and that she needs to follow up with the alternative doctor for the longer refill

## 2025-02-21 DIAGNOSIS — K21.9 GASTROESOPHAGEAL REFLUX DISEASE WITHOUT ESOPHAGITIS: ICD-10-CM

## 2025-02-21 RX ORDER — OMEPRAZOLE 20 MG/1
20 CAPSULE, DELAYED RELEASE ORAL DAILY
Qty: 60 CAPSULE | Refills: 5 | OUTPATIENT
Start: 2025-02-21

## 2025-02-27 ENCOUNTER — APPOINTMENT (OUTPATIENT)
Dept: PRIMARY CARE | Facility: CLINIC | Age: 55
End: 2025-02-27
Payer: COMMERCIAL

## 2025-02-27 VITALS
DIASTOLIC BLOOD PRESSURE: 88 MMHG | HEIGHT: 68 IN | OXYGEN SATURATION: 99 % | HEART RATE: 76 BPM | WEIGHT: 266 LBS | SYSTOLIC BLOOD PRESSURE: 124 MMHG | BODY MASS INDEX: 40.32 KG/M2

## 2025-02-27 DIAGNOSIS — R73.03 PREDIABETES: ICD-10-CM

## 2025-02-27 DIAGNOSIS — R01.1 HEART MURMUR, SYSTOLIC: ICD-10-CM

## 2025-02-27 DIAGNOSIS — R07.9 CHEST PAIN, UNSPECIFIED TYPE: ICD-10-CM

## 2025-02-27 DIAGNOSIS — E11.8 CONTROLLED TYPE 2 DIABETES MELLITUS WITH COMPLICATION, WITHOUT LONG-TERM CURRENT USE OF INSULIN (MULTI): Primary | ICD-10-CM

## 2025-02-27 DIAGNOSIS — F51.01 PRIMARY INSOMNIA: ICD-10-CM

## 2025-02-27 DIAGNOSIS — Z00.00 ANNUAL PHYSICAL EXAM: ICD-10-CM

## 2025-02-27 DIAGNOSIS — E55.9 VITAMIN D DEFICIENCY: ICD-10-CM

## 2025-02-27 DIAGNOSIS — J45.909 UNSPECIFIED ASTHMA, UNCOMPLICATED (HHS-HCC): ICD-10-CM

## 2025-02-27 DIAGNOSIS — D50.8 IRON DEFICIENCY ANEMIA SECONDARY TO INADEQUATE DIETARY IRON INTAKE: ICD-10-CM

## 2025-02-27 DIAGNOSIS — J45.41 MODERATE PERSISTENT ASTHMA WITH ACUTE EXACERBATION (HHS-HCC): ICD-10-CM

## 2025-02-27 DIAGNOSIS — F43.12 CHRONIC POST-TRAUMATIC STRESS DISORDER: ICD-10-CM

## 2025-02-27 DIAGNOSIS — F41.8 ANXIETY ASSOCIATED WITH DEPRESSION: ICD-10-CM

## 2025-02-27 DIAGNOSIS — J45.998 OTHER ASTHMA: ICD-10-CM

## 2025-02-27 PROBLEM — R07.89 OTHER CHEST PAIN: Status: ACTIVE | Noted: 2025-02-27

## 2025-02-27 PROBLEM — M94.0 COSTOCHONDRITIS: Status: RESOLVED | Noted: 2023-03-10 | Resolved: 2025-02-27

## 2025-02-27 PROBLEM — M77.51 TENDINITIS OF RIGHT FOOT: Status: RESOLVED | Noted: 2023-03-10 | Resolved: 2025-02-27

## 2025-02-27 PROBLEM — D50.9 IRON DEFICIENCY ANEMIA: Status: ACTIVE | Noted: 2023-03-10

## 2025-02-27 PROBLEM — M54.50 ACUTE LEFT-SIDED LOW BACK PAIN WITHOUT SCIATICA: Status: RESOLVED | Noted: 2023-03-10 | Resolved: 2025-02-27

## 2025-02-27 PROBLEM — R29.898 SHOULDER WEAKNESS: Status: RESOLVED | Noted: 2024-01-12 | Resolved: 2025-02-27

## 2025-02-27 PROBLEM — E66.813 CLASS 3 SEVERE OBESITY DUE TO EXCESS CALORIES WITHOUT SERIOUS COMORBIDITY WITH BODY MASS INDEX (BMI) OF 40.0 TO 44.9 IN ADULT: Status: ACTIVE | Noted: 2023-03-10

## 2025-02-27 PROBLEM — M25.611 DECREASED ROM OF RIGHT SHOULDER: Status: RESOLVED | Noted: 2024-01-12 | Resolved: 2025-02-27

## 2025-02-27 PROBLEM — M77.8 TENDINITIS OF LEFT HAND: Status: RESOLVED | Noted: 2023-03-10 | Resolved: 2025-02-27

## 2025-02-27 RX ORDER — ALBUTEROL SULFATE 90 UG/1
2 INHALANT RESPIRATORY (INHALATION) EVERY 4 HOURS PRN
Qty: 18 G | Refills: 0 | Status: SHIPPED | OUTPATIENT
Start: 2025-02-27

## 2025-02-27 RX ORDER — QUETIAPINE FUMARATE 25 MG/1
12.5 TABLET, FILM COATED ORAL NIGHTLY
Qty: 15 TABLET | Refills: 5 | Status: SHIPPED | OUTPATIENT
Start: 2025-02-27 | End: 2025-02-27

## 2025-02-27 RX ORDER — BUPROPION HYDROCHLORIDE 450 MG/1
450 TABLET, FILM COATED, EXTENDED RELEASE ORAL DAILY
Qty: 30 TABLET | Refills: 1 | Status: SHIPPED | OUTPATIENT
Start: 2025-02-27 | End: 2025-03-06 | Stop reason: SDUPTHER

## 2025-02-27 RX ORDER — TRAZODONE HYDROCHLORIDE 150 MG/1
150 TABLET ORAL NIGHTLY PRN
Qty: 30 TABLET | Refills: 1 | Status: SHIPPED | OUTPATIENT
Start: 2025-02-27 | End: 2026-02-27

## 2025-02-27 RX ORDER — ESCITALOPRAM OXALATE 10 MG/1
15 TABLET ORAL DAILY
Qty: 45 TABLET | Refills: 1 | Status: SHIPPED | OUTPATIENT
Start: 2025-02-27

## 2025-02-27 ASSESSMENT — ANXIETY QUESTIONNAIRES
1. FEELING NERVOUS, ANXIOUS, OR ON EDGE: NEARLY EVERY DAY
GAD7 TOTAL SCORE: 18
4. TROUBLE RELAXING: NEARLY EVERY DAY
5. BEING SO RESTLESS THAT IT IS HARD TO SIT STILL: SEVERAL DAYS
6. BECOMING EASILY ANNOYED OR IRRITABLE: MORE THAN HALF THE DAYS
3. WORRYING TOO MUCH ABOUT DIFFERENT THINGS: NEARLY EVERY DAY
7. FEELING AFRAID AS IF SOMETHING AWFUL MIGHT HAPPEN: NEARLY EVERY DAY
IF YOU CHECKED OFF ANY PROBLEMS ON THIS QUESTIONNAIRE, HOW DIFFICULT HAVE THESE PROBLEMS MADE IT FOR YOU TO DO YOUR WORK, TAKE CARE OF THINGS AT HOME, OR GET ALONG WITH OTHER PEOPLE: VERY DIFFICULT
2. NOT BEING ABLE TO STOP OR CONTROL WORRYING: NEARLY EVERY DAY

## 2025-02-27 ASSESSMENT — PATIENT HEALTH QUESTIONNAIRE - PHQ9
5. POOR APPETITE OR OVEREATING: SEVERAL DAYS
9. THOUGHTS THAT YOU WOULD BE BETTER OFF DEAD, OR OF HURTING YOURSELF: NOT AT ALL
SUM OF ALL RESPONSES TO PHQ9 QUESTIONS 1 AND 2: 6
1. LITTLE INTEREST OR PLEASURE IN DOING THINGS: NEARLY EVERY DAY
2. FEELING DOWN, DEPRESSED OR HOPELESS: NEARLY EVERY DAY
7. TROUBLE CONCENTRATING ON THINGS, SUCH AS READING THE NEWSPAPER OR WATCHING TELEVISION: MORE THAN HALF THE DAYS
6. FEELING BAD ABOUT YOURSELF - OR THAT YOU ARE A FAILURE OR HAVE LET YOURSELF OR YOUR FAMILY DOWN: NEARLY EVERY DAY
8. MOVING OR SPEAKING SO SLOWLY THAT OTHER PEOPLE COULD HAVE NOTICED. OR THE OPPOSITE, BEING SO FIGETY OR RESTLESS THAT YOU HAVE BEEN MOVING AROUND A LOT MORE THAN USUAL: NOT AT ALL
4. FEELING TIRED OR HAVING LITTLE ENERGY: NEARLY EVERY DAY
3. TROUBLE FALLING OR STAYING ASLEEP OR SLEEPING TOO MUCH: NEARLY EVERY DAY
10. IF YOU CHECKED OFF ANY PROBLEMS, HOW DIFFICULT HAVE THESE PROBLEMS MADE IT FOR YOU TO DO YOUR WORK, TAKE CARE OF THINGS AT HOME, OR GET ALONG WITH OTHER PEOPLE: VERY DIFFICULT
SUM OF ALL RESPONSES TO PHQ QUESTIONS 1-9: 18

## 2025-02-27 NOTE — PROGRESS NOTES
"Subjective   Patient ID: Charo Linder is a 54 y.o. female who presents for Follow-up.    HPI    ANXIETY & DEPRESSION:  Seeing counselor - Vanessa Olguin, psychologist, in [place] [facility] [place]  Father  in December - flared her depression and grieving process  Increased sadness since her father's death  EMDR treatments anticipated   PHQ-9: 6  ELLYN-7 Total Score: 18     CHEST PAIN: recurring chest pains  Has been in ED multiple times and work up negative  Stress test in the past - negative and felt to be related to her anxiety    INSOMNIA: taking Trazodone and Z-quil without improvement  Added gummies OTC - active ingredients SUMO THC active ingredient    DM TYPE 2:  DIABETES PREVENTATIVE CARE:  Fasting Labs:   Results from last 7 days   Lab Units 25  0938   [facility] CHOLESTEROL mg/dL 265*   [facility] TRIGLYCERIDES mg/dL 76   [facility] HDL mg/dL 85      Optho Care: due soon  Foot Exam: Denies numbness and tingling  Hba1c:   HEMOGLOBIN A1c (% of total Hgb)   Date Value   2025 5.9 (H)     Microalbumin:   ALBUMIN (g/dL)   Date Value   2025 4.6      Stopped all alcohol  Better diet - more well rounded    Review of Systems    Review of Systems negative except as noted in HPI and Chief complaint.     Objective       ELLYN-7 Total Score: 18     VITALS:  /88 (BP Location: Left arm, Patient Position: Sitting, BP Cuff Size: Adult)   Pulse 76   Ht 1.727 m (5' 8\")   Wt 121 kg (266 lb)   SpO2 99%   BMI 40.45 kg/m²      Physical Exam  Constitutional:       General: She is not in acute distress.     Appearance: Normal appearance. She is obese. She is not ill-appearing.   HENT:      Head: Normocephalic and atraumatic.   Neck:      Vascular: No carotid bruit.   Cardiovascular:      Rate and Rhythm: Normal rate and regular rhythm.      Pulses: Normal pulses.      Heart sounds: Murmur (systolic murmur loudest at RUSB) heard.      No gallop.   Pulmonary:      Effort: Pulmonary effort is " normal.      Breath sounds: Normal breath sounds. No wheezing, rhonchi or rales.   Musculoskeletal:      Cervical back: Normal range of motion and neck supple. No rigidity or tenderness.   Lymphadenopathy:      Cervical: No cervical adenopathy.   Skin:     General: Skin is warm and dry.   Neurological:      Mental Status: She is alert.   Psychiatric:         Mood and Affect: Mood normal.         Behavior: Behavior normal.         Assessment/Plan   Problem List Items Addressed This Visit       Iron deficiency anemia secondary to inadequate dietary iron intake     Continue Iron supplements and recheck labs prior to next appointment.         Anxiety associated with depression     Continue with Escitalopram.  Follow up with counselor as scheduled.  Recheck with next appointment.         Relevant Medications    escitalopram (Lexapro) 10 mg tablet    Primary insomnia    Relevant Medications    traZODone (Desyrel) 150 mg tablet    Moderate persistent asthma with acute exacerbation (HHS-HCC)     Stable.  Continue with current treatment plan - call if symptoms worsen or change.  Follow up at least annually.         Prediabetes    Chronic post-traumatic stress disorder     Continue with counselor.  May increased Escitalopram if symptoms worsen or change.         Controlled type 2 diabetes mellitus with complication, without long-term current use of insulin (Multi) - Primary     Rechecking labs.  Will adjust medications pending results.         Relevant Orders    Hemoglobin A1c (Completed)     Other Visit Diagnoses       Other asthma        Unspecified asthma, uncomplicated (HHS-HCC)        Relevant Medications    albuterol 90 mcg/actuation inhaler    Annual physical exam        Relevant Orders    CBC (Completed)    Comprehensive Metabolic Panel (Completed)    Lipid Panel (Completed)    TSH with reflex to Free T4 if abnormal (Completed)    Vitamin D deficiency        Relevant Orders    Vitamin D 25-Hydroxy,Total (for eval of  Vitamin D levels) (Completed)    Chest pain, unspecified type        Relevant Orders    Echocardiogram Stress Test    ECG 12 lead (Clinic Performed)    Heart murmur, systolic        Relevant Orders    Echocardiogram Stress Test            FOLLOW UP  for CPE at next available appointment , SOONER WITH ANY PROBLEMS OR CONCERNS.

## 2025-03-06 DIAGNOSIS — F41.8 ANXIETY ASSOCIATED WITH DEPRESSION: ICD-10-CM

## 2025-03-06 RX ORDER — BUPROPION HYDROCHLORIDE 450 MG/1
450 TABLET, FILM COATED, EXTENDED RELEASE ORAL DAILY
Qty: 90 TABLET | Refills: 0 | Status: SHIPPED | OUTPATIENT
Start: 2025-03-06

## 2025-03-06 NOTE — TELEPHONE ENCOUNTER
Patient is needing a refill on her wellbutrin, the pharmacy did not receive after her last visit, pended.

## 2025-03-08 DIAGNOSIS — F41.8 ANXIETY ASSOCIATED WITH DEPRESSION: ICD-10-CM

## 2025-03-10 RX ORDER — BUPROPION HYDROCHLORIDE 300 MG/1
300 TABLET ORAL DAILY
Qty: 30 TABLET | Refills: 0 | OUTPATIENT
Start: 2025-03-10

## 2025-03-10 NOTE — TELEPHONE ENCOUNTER
Follow-up visit: 25  Last visit: 25  Last labs: 24 (AST 17, ALT 15, Scr 0.76) overdue  Last vitals: 25 (/88, HR 76)  Depression screenin25    buPROPion XL 300mg daily was increased to 450mg daily on 25. Will deny Rx request for buPROPion XL 300mg daily. She is due for labs

## 2025-03-13 RX ORDER — BUPROPION HYDROCHLORIDE 300 MG/1
300 TABLET ORAL EVERY MORNING
Qty: 30 TABLET | Refills: 1 | Status: SHIPPED | OUTPATIENT
Start: 2025-03-13 | End: 2025-05-12

## 2025-03-13 NOTE — TELEPHONE ENCOUNTER
Patient's insurance will not cover the 450MG, she is asking if she can get a refill on 300MG as she only has 3 pills left and does not want to run out of medication.

## 2025-03-22 DIAGNOSIS — F51.01 PRIMARY INSOMNIA: ICD-10-CM

## 2025-03-22 DIAGNOSIS — K21.9 GASTROESOPHAGEAL REFLUX DISEASE WITHOUT ESOPHAGITIS: ICD-10-CM

## 2025-03-27 DIAGNOSIS — J45.909 UNSPECIFIED ASTHMA, UNCOMPLICATED (HHS-HCC): ICD-10-CM

## 2025-03-28 LAB
25(OH)D3+25(OH)D2 SERPL-MCNC: 29 NG/ML (ref 30–100)
ALBUMIN SERPL-MCNC: 4.6 G/DL (ref 3.6–5.1)
ALP SERPL-CCNC: 72 U/L (ref 37–153)
ALT SERPL-CCNC: 18 U/L (ref 6–29)
ANION GAP SERPL CALCULATED.4IONS-SCNC: 8 MMOL/L (CALC) (ref 7–17)
AST SERPL-CCNC: 20 U/L (ref 10–35)
BILIRUB SERPL-MCNC: 1 MG/DL (ref 0.2–1.2)
BUN SERPL-MCNC: 20 MG/DL (ref 7–25)
CALCIUM SERPL-MCNC: 9.6 MG/DL (ref 8.6–10.4)
CHLORIDE SERPL-SCNC: 104 MMOL/L (ref 98–110)
CHOLEST SERPL-MCNC: 265 MG/DL
CHOLEST/HDLC SERPL: 3.1 (CALC)
CO2 SERPL-SCNC: 26 MMOL/L (ref 20–32)
CREAT SERPL-MCNC: 0.68 MG/DL (ref 0.5–1.03)
EGFRCR SERPLBLD CKD-EPI 2021: 105 ML/MIN/1.73M2
ERYTHROCYTE [DISTWIDTH] IN BLOOD BY AUTOMATED COUNT: 16.9 % (ref 11–15)
EST. AVERAGE GLUCOSE BLD GHB EST-MCNC: 123 MG/DL
EST. AVERAGE GLUCOSE BLD GHB EST-SCNC: 6.8 MMOL/L
GLUCOSE SERPL-MCNC: 111 MG/DL (ref 65–99)
HBA1C MFR BLD: 5.9 % OF TOTAL HGB
HCT VFR BLD AUTO: 39.8 % (ref 35–45)
HDLC SERPL-MCNC: 85 MG/DL
HGB BLD-MCNC: 12.2 G/DL (ref 11.7–15.5)
LDLC SERPL CALC-MCNC: 162 MG/DL (CALC)
MCH RBC QN AUTO: 23.1 PG (ref 27–33)
MCHC RBC AUTO-ENTMCNC: 30.7 G/DL (ref 32–36)
MCV RBC AUTO: 75.2 FL (ref 80–100)
NONHDLC SERPL-MCNC: 180 MG/DL (CALC)
PLATELET # BLD AUTO: 259 THOUSAND/UL (ref 140–400)
PMV BLD REES-ECKER: 11.1 FL (ref 7.5–12.5)
POTASSIUM SERPL-SCNC: 4.8 MMOL/L (ref 3.5–5.3)
PROT SERPL-MCNC: 7.6 G/DL (ref 6.1–8.1)
RBC # BLD AUTO: 5.29 MILLION/UL (ref 3.8–5.1)
SODIUM SERPL-SCNC: 138 MMOL/L (ref 135–146)
TRIGL SERPL-MCNC: 76 MG/DL
TSH SERPL-ACNC: 1.41 MIU/L
WBC # BLD AUTO: 6.4 THOUSAND/UL (ref 3.8–10.8)

## 2025-03-30 NOTE — ASSESSMENT & PLAN NOTE
Stable.  Continue with current treatment plan - call if symptoms worsen or change.  Follow up at least annually.

## 2025-03-30 NOTE — ASSESSMENT & PLAN NOTE
Continue with Escitalopram.  Follow up with counselor as scheduled.  Recheck with next appointment.

## 2025-03-31 RX ORDER — OMEPRAZOLE 20 MG/1
20 CAPSULE, DELAYED RELEASE ORAL DAILY
Qty: 30 CAPSULE | Refills: 0 | Status: SHIPPED | OUTPATIENT
Start: 2025-03-31

## 2025-03-31 RX ORDER — ALBUTEROL SULFATE 90 UG/1
2 INHALANT RESPIRATORY (INHALATION) EVERY 4 HOURS PRN
Qty: 6.7 G | Refills: 0 | Status: SHIPPED | OUTPATIENT
Start: 2025-03-31

## 2025-03-31 RX ORDER — TRAZODONE HYDROCHLORIDE 150 MG/1
150 TABLET ORAL NIGHTLY PRN
Qty: 30 TABLET | Refills: 0 | Status: SHIPPED | OUTPATIENT
Start: 2025-03-31

## 2025-03-31 NOTE — TELEPHONE ENCOUNTER
Follow-up visit: 5/1/25  Last visit: 2/27/25  Last vitals: 2/27/25 (/88, HR 76)    Refill request from [facility] pharmacy for the following:   - albuterol 90 mcg/actuation inhaler q4h prn  Approved: 1 inhaler. If use is needed more frequently, likely needs to be evaluated at follow-up

## 2025-03-31 NOTE — TELEPHONE ENCOUNTER
Follow-up visit: 25  Last visit: 25  Last labs: Mg - none   Depression screenin25    Refill request from [facility] pharmacy for the following:   - omeprazole 20mg daily   - traZODone 150mg at bedtime prn   Approved: 30 days until follow-up

## 2025-04-14 ENCOUNTER — PATIENT MESSAGE (OUTPATIENT)
Dept: PRIMARY CARE | Facility: CLINIC | Age: 55
End: 2025-04-14
Payer: COMMERCIAL

## 2025-04-27 DIAGNOSIS — K21.9 GASTROESOPHAGEAL REFLUX DISEASE WITHOUT ESOPHAGITIS: ICD-10-CM

## 2025-04-28 DIAGNOSIS — J45.909 UNSPECIFIED ASTHMA, UNCOMPLICATED (HHS-HCC): ICD-10-CM

## 2025-04-28 RX ORDER — OMEPRAZOLE 20 MG/1
20 CAPSULE, DELAYED RELEASE ORAL DAILY
Qty: 30 CAPSULE | Refills: 0 | Status: SHIPPED | OUTPATIENT
Start: 2025-04-28

## 2025-04-28 RX ORDER — ALBUTEROL SULFATE 90 UG/1
2 INHALANT RESPIRATORY (INHALATION) EVERY 4 HOURS PRN
Qty: 18 G | Refills: 0 | Status: SHIPPED | OUTPATIENT
Start: 2025-04-28

## 2025-04-30 ENCOUNTER — HOSPITAL ENCOUNTER (OUTPATIENT)
Dept: RADIOLOGY | Facility: EXTERNAL LOCATION | Age: 55
Discharge: HOME | End: 2025-04-30

## 2025-04-30 ENCOUNTER — APPOINTMENT (OUTPATIENT)
Dept: ORTHOPEDIC SURGERY | Age: 55
End: 2025-04-30
Payer: COMMERCIAL

## 2025-04-30 VITALS — WEIGHT: 266 LBS | BODY MASS INDEX: 40.32 KG/M2 | HEIGHT: 68 IN

## 2025-04-30 DIAGNOSIS — M18.11 PRIMARY OSTEOARTHRITIS OF FIRST CARPOMETACARPAL JOINT OF RIGHT HAND: Primary | ICD-10-CM

## 2025-04-30 PROCEDURE — 3008F BODY MASS INDEX DOCD: CPT | Performed by: EMERGENCY MEDICINE

## 2025-04-30 PROCEDURE — 20604 DRAIN/INJ JOINT/BURSA W/US: CPT | Performed by: EMERGENCY MEDICINE

## 2025-04-30 PROCEDURE — 99214 OFFICE O/P EST MOD 30 MIN: CPT | Performed by: EMERGENCY MEDICINE

## 2025-04-30 RX ORDER — LIDOCAINE HYDROCHLORIDE 10 MG/ML
0.5 INJECTION, SOLUTION INFILTRATION; PERINEURAL
Status: COMPLETED | OUTPATIENT
Start: 2025-04-30 | End: 2025-04-30

## 2025-04-30 RX ORDER — TRIAMCINOLONE ACETONIDE 40 MG/ML
20 INJECTION, SUSPENSION INTRA-ARTICULAR; INTRAMUSCULAR
Status: COMPLETED | OUTPATIENT
Start: 2025-04-30 | End: 2025-04-30

## 2025-04-30 RX ADMIN — LIDOCAINE HYDROCHLORIDE 0.5 ML: 10 INJECTION, SOLUTION INFILTRATION; PERINEURAL at 10:02

## 2025-04-30 RX ADMIN — TRIAMCINOLONE ACETONIDE 20 MG: 40 INJECTION, SUSPENSION INTRA-ARTICULAR; INTRAMUSCULAR at 10:02

## 2025-04-30 NOTE — PROGRESS NOTES
Subjective    Patient ID: Charo Linder is a 55 y.o. female.    Chief Complaint: Pain of the Right Wrist (HERE FOR ANOTHER CMC INJECTION, LAST ONE 8/20/24. STATES IT HELPED UP UNTIL ABOUT 3 WEEKS AGAO)     Last Surgery: No surgery found  Last Surgery Date: No surgery found    Charo is a very pleasant 53-year-old female coming in with pain in her right shoulder referred here by Dr. Ledezma for a possible cortisone injection into her glenohumeral joint under ultrasound guidance and management of adhesive capsulitis of the right shoulder.  She has been having pain for months but denies any known traumatic mechanism or injuries.  This could be an overuse injury as her pain started gradually and eventually progressed to the point where she has had decreased range of motion.  Her pain is diffusely involving her entire right shoulder but is mostly anterior lateral.  She is now having trouble doing her hair and reaching behind her head.  At night her pain is much worse.  She was recently given a prescription for meloxicam as well as physical therapy and is interested in a cortisone shot here today. We agreed for her to continue with physical therapy, home exercises, and her meloxicam.  We also agreed to perform an ultrasound-guided cortisone injection of the right glenohumeral joint.  The patient tolerated the procedure well without any complications.  Activity modifications were reviewed.  She will follow-up with me in 4 weeks and if she has not responded well to this treatment plan we could potentially perform a right-sided subacromial injection at that time.    Update on 2/6/2024.  Patient is coming back in for a follow-up visit.  She had a right glenohumeral cortisone injection on 1/9/2024 and states that she is about 80% better.  Her range of motion has improved and she has been able to go to about 4 physical therapy sessions.  She is sleeping much better and her pain is a mild dull ache. We agreed that she is  doing very well overall.  Her strength and range of motion have improved and she is about 80% better compared to prior to the injection.  No indication for subacromial injection and the patient can follow-up with me as needed moving forward.  She will continue her physical therapy.  Of note she also wants to come back in for me to evaluate some pain that she is having at the base of her right thumb.  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  She is complaining of similar symptoms on the right.  Will likely follow-up with her next week.    Update on 8/20/2024.  Charo is now 54 years old and is coming back in for a follow-up visit but this time with a new chief complaint of some right wrist pain at the base of her right thumb.  She denies any known traumatic events or injuries but has had pain for the past several months.  She had been taking meloxicam until recently and once she stopped taking it her pain got worse.  She states that the pain is worse with activity and better with rest and that she is right-hand dominant.  This is starting to inhibit her ability to work.  She works at a local grocery store.  No fevers or chills.  She has never had an injection in her hand or wrist. We discussed her treatment options and agreed to perform a right CMC joint cortisone injection at the base of her right thumb under ultrasound guidance.  The patient tolerated the procedure well without any complications and activity modifications were reviewed.  She is also going to begin using prescription dose Voltaren gel topically 3 times daily and was provided with a Comfort Cool brace.  She was also given a work note today in case she needs to go home if her symptoms start to worsen as the lidocaine wears off.  She will follow-up with me as needed moving forward.    Update on 4/30/2025.  Charo is coming back in for a follow-up visit for  her acute on chronic right wrist pain at the base of her right thumb secondary to CMC joint osteoarthritis.  We did a cortisone injection under ultrasound guidance on 8/20/2024 that worked extremely well until just a few weeks ago.  She denies any traumatic events or injuries.  No infectious symptoms.  She still wants to avoid surgery but is considering it next year if needed        Objective   Right Hand Exam     Tenderness   Right hand tenderness location: Tenderness to palpation at the first CMC joint with a positive grind test.    Range of Motion   The patient has normal right wrist ROM.     Muscle Strength   The patient has normal right wrist strength.    Tests   Phalen’s Sign: negative  Tinel's sign (median nerve): negative  Finkelstein's test: negative    Other   Erythema: absent  Sensation: normal  Pulse: present    Comments:  Median nerve provocative testing is unremarkable.  Sensation to light touch is intact in the median ulnar and radial nerve distributions.  The patient has full strength with thumb flexion, extension, abduction and adduction.      Left Hand Exam   Left hand exam is normal.          Image Results:  No new imaging    Patient ID: Charo Linder is a 55 y.o. female.    Hand / UE Inj/Asp: R thumb CMC for osteoarthritis on 4/30/2025 10:02 AM  Indications: pain  Details: 25 G needle, ultrasound-guided dorsal approach  Medications: 20 mg triamcinolone acetonide 40 mg/mL; 0.5 mL lidocaine 10 mg/mL (1 %)  Outcome: tolerated well, no immediate complications  Procedure, treatment alternatives, risks and benefits explained, specific risks discussed. Consent was given by the patient. Immediately prior to procedure a time out was called to verify the correct patient, procedure, equipment, support staff and site/side marked as required. Patient was prepped and draped in the usual sterile fashion.           Assessment/Plan   Encounter Diagnoses:  Primary osteoarthritis of first carpometacarpal joint  of right hand    Orders Placed This Encounter    Hand / UE Inj/Asp: R thumb CMC    Point of Care Ultrasound     No follow-ups on file.    We discussed her treatment options and eventually decided to repeat a right first CMC cortisone injection under ultrasound guidance here today in the office.  The patient tolerated the procedure well without any complications and activity modifications were reviewed.  We also discussed PRP at length and she was given a handout.  She is going to contact my office and follow-up as needed depending on if/when she wants to repeat a cortisone or try a PRP injection    ** Please excuse any errors in grammar or translation related to this dictation. Voice recognition software was utilized to prepare this document. **       Almas Hoffman MD  [facility]

## 2025-05-01 ENCOUNTER — APPOINTMENT (OUTPATIENT)
Dept: PRIMARY CARE | Facility: CLINIC | Age: 55
End: 2025-05-01
Payer: COMMERCIAL

## 2025-05-01 VITALS
HEART RATE: 76 BPM | WEIGHT: 259 LBS | SYSTOLIC BLOOD PRESSURE: 124 MMHG | DIASTOLIC BLOOD PRESSURE: 80 MMHG | HEIGHT: 68 IN | BODY MASS INDEX: 39.25 KG/M2 | OXYGEN SATURATION: 98 %

## 2025-05-01 DIAGNOSIS — R01.1 MURMUR, CARDIAC: ICD-10-CM

## 2025-05-01 DIAGNOSIS — K21.9 GASTROESOPHAGEAL REFLUX DISEASE WITHOUT ESOPHAGITIS: ICD-10-CM

## 2025-05-01 DIAGNOSIS — E66.813 CLASS 3 SEVERE OBESITY DUE TO EXCESS CALORIES WITHOUT SERIOUS COMORBIDITY WITH BODY MASS INDEX (BMI) OF 40.0 TO 44.9 IN ADULT: ICD-10-CM

## 2025-05-01 DIAGNOSIS — E55.9 VITAMIN D DEFICIENCY DISEASE: ICD-10-CM

## 2025-05-01 DIAGNOSIS — F41.8 ANXIETY ASSOCIATED WITH DEPRESSION: ICD-10-CM

## 2025-05-01 DIAGNOSIS — F51.01 PRIMARY INSOMNIA: ICD-10-CM

## 2025-05-01 DIAGNOSIS — Z00.00 ANNUAL PHYSICAL EXAM: Primary | ICD-10-CM

## 2025-05-01 DIAGNOSIS — E78.2 MODERATE MIXED HYPERLIPIDEMIA NOT REQUIRING STATIN THERAPY: ICD-10-CM

## 2025-05-01 DIAGNOSIS — E11.8 CONTROLLED TYPE 2 DIABETES MELLITUS WITH COMPLICATION, WITHOUT LONG-TERM CURRENT USE OF INSULIN (MULTI): ICD-10-CM

## 2025-05-01 DIAGNOSIS — Z12.31 ENCOUNTER FOR SCREENING MAMMOGRAM FOR MALIGNANT NEOPLASM OF BREAST: ICD-10-CM

## 2025-05-01 PROCEDURE — 99214 OFFICE O/P EST MOD 30 MIN: CPT | Performed by: FAMILY MEDICINE

## 2025-05-01 PROCEDURE — 3079F DIAST BP 80-89 MM HG: CPT | Performed by: FAMILY MEDICINE

## 2025-05-01 PROCEDURE — 3008F BODY MASS INDEX DOCD: CPT | Performed by: FAMILY MEDICINE

## 2025-05-01 PROCEDURE — 99396 PREV VISIT EST AGE 40-64: CPT | Performed by: FAMILY MEDICINE

## 2025-05-01 PROCEDURE — 3074F SYST BP LT 130 MM HG: CPT | Performed by: FAMILY MEDICINE

## 2025-05-01 PROCEDURE — 1036F TOBACCO NON-USER: CPT | Performed by: FAMILY MEDICINE

## 2025-05-01 RX ORDER — ESCITALOPRAM OXALATE 20 MG/1
20 TABLET ORAL DAILY
Qty: 90 TABLET | Refills: 1 | Status: SHIPPED | OUTPATIENT
Start: 2025-05-01

## 2025-05-01 RX ORDER — TRAZODONE HYDROCHLORIDE 150 MG/1
150 TABLET ORAL NIGHTLY PRN
Qty: 30 TABLET | Refills: 1 | Status: SHIPPED | OUTPATIENT
Start: 2025-05-01

## 2025-05-01 RX ORDER — BUPROPION HYDROCHLORIDE 300 MG/1
300 TABLET ORAL EVERY MORNING
Qty: 90 TABLET | Refills: 1 | Status: SHIPPED | OUTPATIENT
Start: 2025-05-01

## 2025-05-01 ASSESSMENT — LIFESTYLE VARIABLES
SKIP TO QUESTIONS 9-10: 1
AUDIT-C TOTAL SCORE: 2
HOW OFTEN DO YOU HAVE A DRINK CONTAINING ALCOHOL: 2-4 TIMES A MONTH
HOW OFTEN DO YOU HAVE SIX OR MORE DRINKS ON ONE OCCASION: NEVER
HOW MANY STANDARD DRINKS CONTAINING ALCOHOL DO YOU HAVE ON A TYPICAL DAY: 1 OR 2

## 2025-05-01 ASSESSMENT — PATIENT HEALTH QUESTIONNAIRE - PHQ9
5. POOR APPETITE OR OVEREATING: MORE THAN HALF THE DAYS
7. TROUBLE CONCENTRATING ON THINGS, SUCH AS READING THE NEWSPAPER OR WATCHING TELEVISION: MORE THAN HALF THE DAYS
SUM OF ALL RESPONSES TO PHQ9 QUESTIONS 1 AND 2: 5
9. THOUGHTS THAT YOU WOULD BE BETTER OFF DEAD, OR OF HURTING YOURSELF: NOT AT ALL
SUM OF ALL RESPONSES TO PHQ QUESTIONS 1-9: 17
3. TROUBLE FALLING OR STAYING ASLEEP OR SLEEPING TOO MUCH: MORE THAN HALF THE DAYS
4. FEELING TIRED OR HAVING LITTLE ENERGY: NEARLY EVERY DAY
6. FEELING BAD ABOUT YOURSELF - OR THAT YOU ARE A FAILURE OR HAVE LET YOURSELF OR YOUR FAMILY DOWN: NEARLY EVERY DAY
2. FEELING DOWN, DEPRESSED OR HOPELESS: NEARLY EVERY DAY
10. IF YOU CHECKED OFF ANY PROBLEMS, HOW DIFFICULT HAVE THESE PROBLEMS MADE IT FOR YOU TO DO YOUR WORK, TAKE CARE OF THINGS AT HOME, OR GET ALONG WITH OTHER PEOPLE: SOMEWHAT DIFFICULT
8. MOVING OR SPEAKING SO SLOWLY THAT OTHER PEOPLE COULD HAVE NOTICED. OR THE OPPOSITE, BEING SO FIGETY OR RESTLESS THAT YOU HAVE BEEN MOVING AROUND A LOT MORE THAN USUAL: NOT AT ALL
1. LITTLE INTEREST OR PLEASURE IN DOING THINGS: MORE THAN HALF THE DAYS

## 2025-05-01 ASSESSMENT — ANXIETY QUESTIONNAIRES
2. NOT BEING ABLE TO STOP OR CONTROL WORRYING: NEARLY EVERY DAY
1. FEELING NERVOUS, ANXIOUS, OR ON EDGE: NEARLY EVERY DAY
GAD7 TOTAL SCORE: 16
4. TROUBLE RELAXING: MORE THAN HALF THE DAYS
7. FEELING AFRAID AS IF SOMETHING AWFUL MIGHT HAPPEN: SEVERAL DAYS
5. BEING SO RESTLESS THAT IT IS HARD TO SIT STILL: SEVERAL DAYS
3. WORRYING TOO MUCH ABOUT DIFFERENT THINGS: NEARLY EVERY DAY
6. BECOMING EASILY ANNOYED OR IRRITABLE: NEARLY EVERY DAY
IF YOU CHECKED OFF ANY PROBLEMS ON THIS QUESTIONNAIRE, HOW DIFFICULT HAVE THESE PROBLEMS MADE IT FOR YOU TO DO YOUR WORK, TAKE CARE OF THINGS AT HOME, OR GET ALONG WITH OTHER PEOPLE: SOMEWHAT DIFFICULT

## 2025-05-01 NOTE — PROGRESS NOTES
"Subjective     Patient ID: Charo Linder is a 55 y.o. female who presents for Annual Exam.    HPI     History of Present Illness  Charo Linder is a 55 year old female with prediabetes and hyperlipidemia who presents for a routine physical exam and management of her conditions.    ARTHRALGIA:  She manages joint inflammation with turmeric supplements, taking 1000 mg daily with orange juice to reduce reliance on Tylenol Arthritis. She monitors for gastrointestinal side effects.    INSULIN RESISTANCE/PREDIABETES:  Her fasting blood sugar is 111 mg/dL and hemoglobin A1c is 5.9%, indicating prediabetes. She has a history of higher A1c levels in the diabetic range but improved through dietary changes, including reduced sugar intake. She previously discontinued metformin due to adverse effects.    HYPERLIPIDEMIA:  Review of last fasting lipids with good control.  Lab Results   Component Value Date    CHOL 265 (H) 03/27/2025     Lab Results   Component Value Date    HDL 85 03/27/2025     Lab Results   Component Value Date    LDLCALC 162 (H) 03/27/2025     Lab Results   Component Value Date    TRIG 76 03/27/2025     No components found for: \"CHOLHDL\"     Her cholesterol levels are elevated, with a total cholesterol of 265 mg/dL and LDL of 162 mg/dL, though her HDL is high. She has a family history of strokes and high cholesterol. She is making dietary changes to manage her cholesterol.    DIET:  She reports low vitamin D levels. She currently takes a multivitamin and consumes a lot of milk.    ELIMINATION  She has a family history of celiac disease and experiences stomach discomfort with gluten, leading her to eliminate it from her diet, which has improved her symptoms. She reports regular bowel movements due to increased vegetable intake.    SLEEP:  She experiences sleep disturbances, waking at night to urinate but can fall back asleep. She takes trazodone for sleep and has stopped using gummies due to paranoia. " She is reducing alcohol intake as it disrupts her sleep and increases appetite.    ANXIETY:  She is undergoing counseling for grief and stress management, using EMDR therapy. She is on Lexapro 15 mg and Wellbutrin 300 mg for mood stabilization, reporting ongoing stress and anxiety, particularly around men.  Patient Health Questionnaire-9 Score: 17  ELLYN-7 Total Score: 16     MURMUR:  She has a history of mitral valve prolapse and reports occasional severe chest pain. Past echocardiograms, the last in 2020, showed mild thickening of the mitral valve and impaired relaxation of the left ventricle. No swelling in ankles or hands.    OBESITY:  She is actively trying to lose weight, having lost 11 pounds recently through increased physical activity and dietary adjustments, such as eliminating soda and reducing alcohol consumption. She is cautious about weight loss medications due to potential side effects.    SOC Hx:   Tobacco Use: Low Risk  (5/1/2025)    Patient History     Smoking Tobacco Use: Never     Smokeless Tobacco Use: Never     Passive Exposure: Not on file     Alcohol Use: Not At Risk (5/1/2025)    AUDIT-C     Frequency of Alcohol Consumption: 2-4 times a month     Average Number of Drinks: 1 or 2     Frequency of Binge Drinking: Never     DIET: increasing protein; decreased wheat  Working withy dietician/nutritionist    EXERCISE: starting home workout tapes    ELIMINATION: no constipation or diarrhea  COLON CA SCREENING: not covered by insurance    URINARY SYSTEM: urine leakage- does wear poise but drinking fluids regularly    SLEEP:  much improved with Trazodone    MOODS: still some grief from her father's death  She is seeing counselor regularly - Vanessa Olguin  Starting EMDR techniques  Patient Health Questionnaire-9 Score: 17  ELLYN-7 Total Score: 16     OB/GYN: LMP: No LMP recorded.  Menstrual cycles - menopausal  Last pap date: 10/27/2020  Abnormal pap? no - co-test  MAMMO:  discussed       Review of  "Systems  Review of Systems negative except as noted in HPI and Chief complaint.     Objective     VITALS:  /80 (BP Location: Left arm, Patient Position: Sitting, BP Cuff Size: Adult)   Pulse 76   Ht 1.727 m (5' 8\")   Wt 117 kg (259 lb)   SpO2 98%   BMI 39.38 kg/m²     Physical Exam  Constitutional:       General: She is not in acute distress.     Appearance: Normal appearance.   HENT:      Head: Normocephalic and atraumatic.      Right Ear: Tympanic membrane, ear canal and external ear normal.      Left Ear: Tympanic membrane, ear canal and external ear normal.      Nose: Nose normal.      Mouth/Throat:      Mouth: Mucous membranes are moist.      Pharynx: No oropharyngeal exudate or posterior oropharyngeal erythema.   Eyes:      Extraocular Movements: Extraocular movements intact.      Conjunctiva/sclera: Conjunctivae normal.      Pupils: Pupils are equal, round, and reactive to light.   Cardiovascular:      Rate and Rhythm: Normal rate and regular rhythm.      Heart sounds: Murmur heard.      Gallop: sstolic murmur loudest at RUSB, no click noted.   Pulmonary:      Effort: Pulmonary effort is normal.      Breath sounds: Normal breath sounds.   Abdominal:      General: Bowel sounds are normal.      Palpations: Abdomen is soft.   Musculoskeletal:         General: Normal range of motion.      Cervical back: No rigidity.   Lymphadenopathy:      Cervical: No cervical adenopathy.   Skin:     General: Skin is warm and dry.      Findings: No rash.   Neurological:      General: No focal deficit present.      Mental Status: She is alert and oriented to person, place, and time.      Cranial Nerves: No cranial nerve deficit.      Gait: Gait normal.   Psychiatric:         Mood and Affect: Mood normal.         Behavior: Behavior normal.         Assessment & Plan  Annual physical exam  Reviewed recent fasting labs and recommended colonoscopy but her insurance currently does not cover fora ny change in symptoms.   "     Anxiety associated with depression  Experiencing ongoing depression and anxiety, with some improvement. Currently on Lexapro 15 mg and Wellbutrin 300 mg. Lexapro increased to 20 mg to address persistent symptoms. Engaged in counseling and EMDR therapy. Discussed the impact of alcohol as a depressant and its interaction with Lexapro.  - Increase Lexapro to 20 mg daily.  - Continue Wellbutrin 300 mg daily.  - Continue counseling and EMDR therapy.  - Monitor mood and anxiety symptoms, reassess in one month.  Orders:    escitalopram (Lexapro) 20 mg tablet; Take 1 tablet (20 mg) by mouth once daily.    buPROPion XL (Wellbutrin XL) 300 mg 24 hr tablet; Take 1 tablet (300 mg) by mouth once daily in the morning. Do not crush, chew, or split.    Primary insomnia    Orders:    traZODone (Desyrel) 150 mg tablet; Take 1 tablet (150 mg) by mouth as needed at bedtime for sleep.    Encounter for screening mammogram for malignant neoplasm of breast    Orders:    BI mammo bilateral screening tomosynthesis; Future    Controlled type 2 diabetes mellitus with complication, without long-term current use of insulin (Multi)  Prediabetes with history of elevated Hba1c  Fasting glucose at 111 mg/dL and HbA1c at 5.9% indicate prediabetes. Previous levels were in the diabetic range, but current levels show improvement without medication. She has made dietary changes, reducing sugar intake and improving food choices. GLP-1 agonists were discussed as a potential future option if blood sugar levels increase or if insurance coverage allows.  - Monitor blood glucose and HbA1c regularly.  - Continue dietary modifications to maintain glycemic control.  - Consider GLP-1 agonists if blood sugar levels increase or if insurance coverage allows.       Class 3 severe obesity due to excess calories without serious comorbidity with body mass index (BMI) of 40.0 to 44.9 in adult         Moderate mixed hyperlipidemia not requiring statin therapy  Total  cholesterol is elevated at 265 mg/dL, with LDL at 162 mg/dL. HDL is high, which is favorable. Cardiovascular risk is low at 2.6%. Family history includes strokes and hypercholesterolemia. Discussed the importance of lifestyle modifications to manage cholesterol levels.  - Monitor lipid profile regularly.  - Continue lifestyle modifications, including dietary changes and increased physical activity.       Gastroesophageal reflux disease without esophagitis  Attempting to discontinue omeprazole due to long-term use concerns but experiences symptoms when stopping. Weight loss noted, which may help with symptoms.  - Continue omeprazole as needed for symptom control.  - Monitor symptoms and consider gradual tapering if possible.       Murmur, cardiac  Mitral valve prolapse  Mitral valve prolapse with mild thickening noted on previous echocardiogram. No significant symptoms reported. Echocardiogram from 2020 showed impaired relaxation of the left ventricle, suggesting possible increased cardiac workload. Discussed the potential need for a stress echocardiogram to assess cardiac function and structure, especially if symptoms worsen or deductible is met.  - Consider stress echocardiogram to assess cardiac function and structure, especially if symptoms worsen or deductible is met.  - Monitor for symptoms such as palpitations, swelling, or dyspnea.       Vitamin D deficiency disease  Vitamin D levels are slightly low. She is taking a multivitamin but may need additional supplementation. Discussed the benefits of vitamin D and calcium supplementation for bone health, especially post-menopause.  - Recommend additional vitamin D supplementation, 3067-1775 IU daily.  - Check current multivitamin for vitamin D content.            Assessment & Plan  Wellness Visit  Routine wellness visit. Discussed current health status, lifestyle changes, and preventive care measures. She is taking turmeric for joint inflammation and has reduced  Tylenol arthritis use. Also taking a multivitamin and considering additional vitamin D supplementation.  - Continue current lifestyle modifications, including increased protein intake and reduced wheat consumption.  - Encourage regular physical activity, such as walking and exercise tapes.  - Discussed the importance of calcium and vitamin D supplementation for bone health.  - Encourage continued counseling and EMDR therapy for mental health support.    Recording duration: 30 minutes         FOLLOW UP 3-6 months, sooner with any problems or concerns.    Inés Cat DO 05/01/25 10:18 AM    This medical note was created with the assistance of artificial intelligence (AI) for documentation purposes. The content has been reviewed and confirmed by the healthcare provider for accuracy and completeness. Patient consented to the use of audio recording and use of AI during their visit.

## 2025-05-01 NOTE — ASSESSMENT & PLAN NOTE
Orders:    traZODone (Desyrel) 150 mg tablet; Take 1 tablet (150 mg) by mouth as needed at bedtime for sleep.

## 2025-05-01 NOTE — ASSESSMENT & PLAN NOTE
Experiencing ongoing depression and anxiety, with some improvement. Currently on Lexapro 15 mg and Wellbutrin 300 mg. Lexapro increased to 20 mg to address persistent symptoms. Engaged in counseling and EMDR therapy. Discussed the impact of alcohol as a depressant and its interaction with Lexapro.  - Increase Lexapro to 20 mg daily.  - Continue Wellbutrin 300 mg daily.  - Continue counseling and EMDR therapy.  - Monitor mood and anxiety symptoms, reassess in one month.  Orders:    escitalopram (Lexapro) 20 mg tablet; Take 1 tablet (20 mg) by mouth once daily.    buPROPion XL (Wellbutrin XL) 300 mg 24 hr tablet; Take 1 tablet (300 mg) by mouth once daily in the morning. Do not crush, chew, or split.

## 2025-05-02 NOTE — ASSESSMENT & PLAN NOTE
Attempting to discontinue omeprazole due to long-term use concerns but experiences symptoms when stopping. Weight loss noted, which may help with symptoms.  - Continue omeprazole as needed for symptom control.  - Monitor symptoms and consider gradual tapering if possible.

## 2025-05-02 NOTE — ASSESSMENT & PLAN NOTE
Mitral valve prolapse  Mitral valve prolapse with mild thickening noted on previous echocardiogram. No significant symptoms reported. Echocardiogram from 2020 showed impaired relaxation of the left ventricle, suggesting possible increased cardiac workload. Discussed the potential need for a stress echocardiogram to assess cardiac function and structure, especially if symptoms worsen or deductible is met.  - Consider stress echocardiogram to assess cardiac function and structure, especially if symptoms worsen or deductible is met.  - Monitor for symptoms such as palpitations, swelling, or dyspnea.

## 2025-05-02 NOTE — ASSESSMENT & PLAN NOTE
Total cholesterol is elevated at 265 mg/dL, with LDL at 162 mg/dL. HDL is high, which is favorable. Cardiovascular risk is low at 2.6%. Family history includes strokes and hypercholesterolemia. Discussed the importance of lifestyle modifications to manage cholesterol levels.  - Monitor lipid profile regularly.  - Continue lifestyle modifications, including dietary changes and increased physical activity.

## 2025-05-02 NOTE — ASSESSMENT & PLAN NOTE
Prediabetes with history of elevated Hba1c  Fasting glucose at 111 mg/dL and HbA1c at 5.9% indicate prediabetes. Previous levels were in the diabetic range, but current levels show improvement without medication. She has made dietary changes, reducing sugar intake and improving food choices. GLP-1 agonists were discussed as a potential future option if blood sugar levels increase or if insurance coverage allows.  - Monitor blood glucose and HbA1c regularly.  - Continue dietary modifications to maintain glycemic control.  - Consider GLP-1 agonists if blood sugar levels increase or if insurance coverage allows.

## 2025-05-02 NOTE — ASSESSMENT & PLAN NOTE
Vitamin D levels are slightly low. She is taking a multivitamin but may need additional supplementation. Discussed the benefits of vitamin D and calcium supplementation for bone health, especially post-menopause.  - Recommend additional vitamin D supplementation, 4340-0051 IU daily.  - Check current multivitamin for vitamin D content.

## 2025-05-08 ENCOUNTER — PATIENT MESSAGE (OUTPATIENT)
Dept: PRIMARY CARE | Facility: CLINIC | Age: 55
End: 2025-05-08

## 2025-05-08 ENCOUNTER — HOSPITAL ENCOUNTER (OUTPATIENT)
Dept: RADIOLOGY | Facility: CLINIC | Age: 55
Discharge: HOME | End: 2025-05-08
Payer: COMMERCIAL

## 2025-05-08 DIAGNOSIS — F41.8 ANXIETY ASSOCIATED WITH DEPRESSION: ICD-10-CM

## 2025-05-08 DIAGNOSIS — Z12.31 ENCOUNTER FOR SCREENING MAMMOGRAM FOR MALIGNANT NEOPLASM OF BREAST: ICD-10-CM

## 2025-05-08 PROCEDURE — 77067 SCR MAMMO BI INCL CAD: CPT

## 2025-05-08 RX ORDER — ESCITALOPRAM OXALATE 10 MG/1
15 TABLET ORAL DAILY
Qty: 45 TABLET | Refills: 0 | OUTPATIENT
Start: 2025-05-08

## 2025-05-09 ENCOUNTER — HOSPITAL ENCOUNTER (EMERGENCY)
Facility: HOSPITAL | Age: 55
Discharge: HOME | End: 2025-05-09
Attending: EMERGENCY MEDICINE
Payer: COMMERCIAL

## 2025-05-09 VITALS
HEIGHT: 67 IN | OXYGEN SATURATION: 98 % | BODY MASS INDEX: 40.65 KG/M2 | TEMPERATURE: 97.8 F | HEART RATE: 75 BPM | RESPIRATION RATE: 18 BRPM | SYSTOLIC BLOOD PRESSURE: 132 MMHG | WEIGHT: 259 LBS | DIASTOLIC BLOOD PRESSURE: 76 MMHG

## 2025-05-09 DIAGNOSIS — S39.012A LUMBAR STRAIN, INITIAL ENCOUNTER: Primary | ICD-10-CM

## 2025-05-09 LAB
APPEARANCE UR: CLEAR
BILIRUB UR STRIP.AUTO-MCNC: NEGATIVE MG/DL
COLOR UR: ABNORMAL
GLUCOSE UR STRIP.AUTO-MCNC: NORMAL MG/DL
HOLD SPECIMEN: 293
KETONES UR STRIP.AUTO-MCNC: NEGATIVE MG/DL
LEUKOCYTE ESTERASE UR QL STRIP.AUTO: ABNORMAL
MUCOUS THREADS #/AREA URNS AUTO: ABNORMAL /LPF
NITRITE UR QL STRIP.AUTO: NEGATIVE
PH UR STRIP.AUTO: 6 [PH]
PROT UR STRIP.AUTO-MCNC: NEGATIVE MG/DL
RBC # UR STRIP.AUTO: NEGATIVE MG/DL
RBC #/AREA URNS AUTO: ABNORMAL /HPF
SP GR UR STRIP.AUTO: 1.03
SQUAMOUS #/AREA URNS AUTO: ABNORMAL /HPF
UROBILINOGEN UR STRIP.AUTO-MCNC: NORMAL MG/DL
WBC #/AREA URNS AUTO: ABNORMAL /HPF

## 2025-05-09 PROCEDURE — 99284 EMERGENCY DEPT VISIT MOD MDM: CPT | Performed by: EMERGENCY MEDICINE

## 2025-05-09 PROCEDURE — 87086 URINE CULTURE/COLONY COUNT: CPT | Mod: PORLAB | Performed by: EMERGENCY MEDICINE

## 2025-05-09 PROCEDURE — 2500000001 HC RX 250 WO HCPCS SELF ADMINISTERED DRUGS (ALT 637 FOR MEDICARE OP): Performed by: EMERGENCY MEDICINE

## 2025-05-09 PROCEDURE — 2500000004 HC RX 250 GENERAL PHARMACY W/ HCPCS (ALT 636 FOR OP/ED): Mod: JZ | Performed by: EMERGENCY MEDICINE

## 2025-05-09 PROCEDURE — 2500000005 HC RX 250 GENERAL PHARMACY W/O HCPCS

## 2025-05-09 PROCEDURE — 81001 URINALYSIS AUTO W/SCOPE: CPT | Performed by: EMERGENCY MEDICINE

## 2025-05-09 PROCEDURE — 96372 THER/PROPH/DIAG INJ SC/IM: CPT | Performed by: EMERGENCY MEDICINE

## 2025-05-09 RX ORDER — ORPHENADRINE CITRATE 30 MG/ML
60 INJECTION INTRAMUSCULAR; INTRAVENOUS ONCE
Status: COMPLETED | OUTPATIENT
Start: 2025-05-09 | End: 2025-05-09

## 2025-05-09 RX ORDER — LIDOCAINE 50 MG/G
1 PATCH TOPICAL DAILY
Qty: 10 PATCH | Refills: 0 | Status: SHIPPED | OUTPATIENT
Start: 2025-05-09

## 2025-05-09 RX ORDER — HYDROCODONE BITARTRATE AND ACETAMINOPHEN 5; 325 MG/1; MG/1
1 TABLET ORAL EVERY 6 HOURS PRN
Qty: 12 TABLET | Refills: 0 | Status: SHIPPED | OUTPATIENT
Start: 2025-05-09 | End: 2025-05-12

## 2025-05-09 RX ORDER — LIDOCAINE 560 MG/1
PATCH PERCUTANEOUS; TOPICAL; TRANSDERMAL
Status: DISCONTINUED
Start: 2025-05-09 | End: 2025-05-09 | Stop reason: HOSPADM

## 2025-05-09 RX ORDER — LIDOCAINE 560 MG/1
1 PATCH PERCUTANEOUS; TOPICAL; TRANSDERMAL DAILY
Status: DISCONTINUED | OUTPATIENT
Start: 2025-05-09 | End: 2025-05-09 | Stop reason: HOSPADM

## 2025-05-09 RX ORDER — KETOROLAC TROMETHAMINE 30 MG/ML
30 INJECTION, SOLUTION INTRAMUSCULAR; INTRAVENOUS ONCE
Status: COMPLETED | OUTPATIENT
Start: 2025-05-09 | End: 2025-05-09

## 2025-05-09 RX ORDER — HYDROCODONE BITARTRATE AND ACETAMINOPHEN 5; 325 MG/1; MG/1
1 TABLET ORAL ONCE
Refills: 0 | Status: COMPLETED | OUTPATIENT
Start: 2025-05-09 | End: 2025-05-09

## 2025-05-09 RX ORDER — CYCLOBENZAPRINE HCL 10 MG
10 TABLET ORAL 3 TIMES DAILY PRN
Qty: 15 TABLET | Refills: 0 | Status: SHIPPED | OUTPATIENT
Start: 2025-05-09 | End: 2025-05-14

## 2025-05-09 RX ADMIN — HYDROCODONE BITARTRATE AND ACETAMINOPHEN 1 TABLET: 5; 325 TABLET ORAL at 02:39

## 2025-05-09 RX ADMIN — LIDOCAINE 4% 1 PATCH: 40 PATCH TOPICAL at 02:39

## 2025-05-09 RX ADMIN — ORPHENADRINE CITRATE 60 MG: 60 INJECTION INTRAMUSCULAR; INTRAVENOUS at 01:40

## 2025-05-09 RX ADMIN — KETOROLAC TROMETHAMINE 30 MG: 30 INJECTION, SOLUTION INTRAMUSCULAR at 01:40

## 2025-05-09 RX ADMIN — LIDOCAINE 1 PATCH: 560 PATCH PERCUTANEOUS; TOPICAL; TRANSDERMAL at 02:39

## 2025-05-09 ASSESSMENT — COLUMBIA-SUICIDE SEVERITY RATING SCALE - C-SSRS
2. HAVE YOU ACTUALLY HAD ANY THOUGHTS OF KILLING YOURSELF?: NO
6. HAVE YOU EVER DONE ANYTHING, STARTED TO DO ANYTHING, OR PREPARED TO DO ANYTHING TO END YOUR LIFE?: NO
1. IN THE PAST MONTH, HAVE YOU WISHED YOU WERE DEAD OR WISHED YOU COULD GO TO SLEEP AND NOT WAKE UP?: NO

## 2025-05-09 ASSESSMENT — PAIN SCALES - GENERAL: PAINLEVEL_OUTOF10: 10 - WORST POSSIBLE PAIN

## 2025-05-09 ASSESSMENT — PAIN - FUNCTIONAL ASSESSMENT: PAIN_FUNCTIONAL_ASSESSMENT: 0-10

## 2025-05-09 NOTE — ED PROVIDER NOTES
HPI   Chief Complaint   Patient presents with    Back Pain     Onset around 2pm while walking around [facility]. Left flank/buttocks area pain intermittent. Worse with movement. Not radiating down leg. Denies urinary symptoms       Patient presents emergency department secondary to left flank pain.  Patient has had waxing and waning left flank pain since about 2 PM today.  Patient states that it started when she was walking around the store with her sister.  She has tried heating pad and Bengay without significant relief.  She has not tried any oral medication.  Pain does not go down the leg.  Pain does not radiate around to the abdomen.  Patient does have a remote history of kidney stones but states that this pain feels different.  It is worsened by movement.  No pain with urination or urinary frequency.  No blood in the urine.  No diarrhea or constipation.  No blood in the stools.  No chest pain or shortness of breath.  No persistent cough or sputum production.  No other complaints at this time.                          [place] Coma Scale Score: 15                  Patient History   Medical History[1]  Surgical History[2]  Family History[3]  Social History[4]    Physical Exam   ED Triage Vitals [05/09/25 0103]   Temperature Heart Rate Respirations BP   36.6 °C (97.8 °F) 83 18 128/68      Pulse Ox Temp Source Heart Rate Source Patient Position   96 % Temporal Monitor Sitting      BP Location FiO2 (%)     Left arm --       Physical Exam  Vitals and nursing note reviewed.   Constitutional:       Appearance: Normal appearance.   HENT:      Head: Normocephalic.      Nose: Nose normal.      Mouth/Throat:      Mouth: Mucous membranes are moist.   Eyes:      Extraocular Movements: Extraocular movements intact.      Pupils: Pupils are equal, round, and reactive to light.   Cardiovascular:      Rate and Rhythm: Normal rate and regular rhythm.      Pulses: Normal pulses.      Heart sounds: Normal heart sounds.   Pulmonary:       Effort: Pulmonary effort is normal.      Breath sounds: Normal breath sounds.   Abdominal:      General: Abdomen is flat. Bowel sounds are normal.      Palpations: Abdomen is soft.      Tenderness: There is no abdominal tenderness. There is no guarding or rebound.   Musculoskeletal:         General: Normal range of motion.      Cervical back: Normal range of motion.      Comments: Left lumbar paraspinal tenderness to palpation.  No midline tenderness or step-offs.  Patient does have pain with movement in the bed.  She does have pain with left leg straight leg test.   Skin:     General: Skin is warm and dry.      Capillary Refill: Capillary refill takes less than 2 seconds.   Neurological:      General: No focal deficit present.      Mental Status: She is alert and oriented to person, place, and time.   Psychiatric:         Mood and Affect: Mood normal.         Behavior: Behavior normal.       Labs Reviewed   URINALYSIS WITH REFLEX CULTURE AND MICROSCOPIC - Abnormal       Result Value    Color, Urine Light-Yellow      Appearance, Urine Clear      Specific Gravity, Urine 1.029      pH, Urine 6.0      Protein, Urine NEGATIVE      Glucose, Urine Normal      Blood, Urine NEGATIVE      Ketones, Urine NEGATIVE      Bilirubin, Urine NEGATIVE      Urobilinogen, Urine Normal      Nitrite, Urine NEGATIVE      Leukocyte Esterase, Urine 25 Drew/uL (*)    MICROSCOPIC ONLY, URINE - Abnormal    WBC, Urine 6-10 (*)     RBC, Urine NONE      Squamous Epithelial Cells, Urine 1-9 (SPARSE)      Mucus, Urine 2+     URINE CULTURE   URINALYSIS WITH REFLEX CULTURE AND MICROSCOPIC    Narrative:     The following orders were created for panel order Urinalysis with Reflex Culture and Microscopic.  Procedure                               Abnormality         Status                     ---------                               -----------         ------                     Urinalysis with Reflex C...[[id number]]  Abnormal            Final result                Extra Urine Gray Tube[[id number]]                            In process                   Please view results for these tests on the individual orders.   EXTRA URINE GRAY TUBE     Pain Management Panel          Latest Ref Rng & Units 3/6/2020   Pain Management Panel   Amphetamine Screen, Urine NEGATIVE PRESUMPTIVE NEGATIVE    Barbiturate Screen, Urine NEGATIVE PRESUMPTIVE NEGATIVE    Methadone Screen, Urine NEGATIVE PRESUMPTIVE NEGATIVE      No orders to display     ED Course & MDM   Diagnoses as of 05/09/25 0340   Lumbar strain, initial encounter       Medical Decision Making  Patient presents secondary to left lumbar back pain.  Patient is evaluated for musculoskeletal pain versus hematuria or urinary tract infection.  Urinalysis is obtained.  Patient is given IM Toradol and Norflex.  Patient had minimal relief with this.  Patient was then given a lidocaine patch and Norco tablet.  She did have improvement with this.  Urinalysis shows 6-10 white blood cells and 1-9 epithelial cells.  At this time this appears contaminated.  Patient's urine is sent for culture.  No evidence of hematuria.  On reevaluation patient states that she does feel improved enough that she would like to go home.  She is given prescriptions for Flexeril, Norco and lidocaine patches.  She is to return for worsening symptoms or follow-up with her primary care physician.  She is stable for discharge at this time.        Procedure  Procedures       [1]   Past Medical History:  Diagnosis Date    Arthritis     Bursitis of shoulder     Osteoporosis     Personal history of other diseases of the circulatory system 03/01/2016    History of mitral valve prolapse    Personal history of other diseases of the nervous system and sense organs     History of migraine headaches    Personal history of other diseases of the respiratory system 02/13/2020    History of acute sinusitis    Personal history of other diseases of the respiratory system      Personal history of asthma    Personal history of other medical treatment     History of screening mammography    Urinary tract infection, site not specified 02/25/2020    Acute UTI   [2]   Past Surgical History:  Procedure Laterality Date    TONSILLECTOMY  05/12/2014    Tonsillectomy With Adenoidectomy    TOTAL KNEE ARTHROPLASTY  01/07/2016    Knee Replacement   [3]   Family History  Problem Relation Name Age of Onset    Diabetes Mother Mom     Fibromyalgia Mother Mom     Hyperlipidemia Mother Mom     Stroke Father      Blood clot Father      Scoliosis Sister Sister     Breast cancer Maternal Grandmother     [4]   Social History  Tobacco Use    Smoking status: Never    Smokeless tobacco: Never   Substance Use Topics    Alcohol use: Not Currently     Comment: social    Drug use: Never        Vandana Barrios MD  05/09/25 0341

## 2025-05-09 NOTE — Clinical Note
Charo Linder was seen and treated in our emergency department on 5/9/2025.  She may return to work on 05/12/2025.       If you have any questions or concerns, please don't hesitate to call.      Vandana Barrios MD

## 2025-05-10 LAB — BACTERIA UR CULT: NORMAL

## 2025-05-13 RX ORDER — ESCITALOPRAM OXALATE 20 MG/1
20 TABLET ORAL DAILY
Qty: 90 TABLET | Refills: 1 | Status: SHIPPED | OUTPATIENT
Start: 2025-05-13

## 2025-05-23 DIAGNOSIS — R92.8 ABNORMAL MAMMOGRAM OF BOTH BREASTS: Primary | ICD-10-CM

## 2025-05-23 DIAGNOSIS — R92.8 ABNORMAL MAMMOGRAM OF LEFT BREAST: ICD-10-CM

## 2025-05-23 DIAGNOSIS — R92.8 ABNORMAL MAMMOGRAM OF RIGHT BREAST: ICD-10-CM

## 2025-05-25 DIAGNOSIS — K21.9 GASTROESOPHAGEAL REFLUX DISEASE WITHOUT ESOPHAGITIS: ICD-10-CM

## 2025-05-26 DIAGNOSIS — J45.909 UNSPECIFIED ASTHMA, UNCOMPLICATED (HHS-HCC): ICD-10-CM

## 2025-05-29 ENCOUNTER — APPOINTMENT (OUTPATIENT)
Dept: PRIMARY CARE | Facility: CLINIC | Age: 55
End: 2025-05-29
Payer: COMMERCIAL

## 2025-06-02 ENCOUNTER — PATIENT MESSAGE (OUTPATIENT)
Dept: PRIMARY CARE | Facility: CLINIC | Age: 55
End: 2025-06-02
Payer: COMMERCIAL

## 2025-06-02 DIAGNOSIS — K21.9 GASTROESOPHAGEAL REFLUX DISEASE WITHOUT ESOPHAGITIS: ICD-10-CM

## 2025-06-02 RX ORDER — OMEPRAZOLE 20 MG/1
20 CAPSULE, DELAYED RELEASE ORAL DAILY
Qty: 90 CAPSULE | Refills: 1 | Status: SHIPPED | OUTPATIENT
Start: 2025-06-02

## 2025-06-30 RX ORDER — OMEPRAZOLE 20 MG/1
20 CAPSULE, DELAYED RELEASE ORAL DAILY
Qty: 30 CAPSULE | Refills: 0 | OUTPATIENT
Start: 2025-06-30

## 2025-06-30 RX ORDER — ALBUTEROL SULFATE 90 UG/1
2 INHALANT RESPIRATORY (INHALATION) EVERY 4 HOURS PRN
Qty: 6.7 G | Refills: 1 | Status: SHIPPED | OUTPATIENT
Start: 2025-06-30 | End: 2025-06-30

## 2025-06-30 RX ORDER — ALBUTEROL SULFATE 90 UG/1
2 INHALANT RESPIRATORY (INHALATION) EVERY 4 HOURS PRN
Qty: 6.7 G | Refills: 1 | Status: SHIPPED | OUTPATIENT
Start: 2025-06-30

## 2025-07-17 ENCOUNTER — OFFICE VISIT (OUTPATIENT)
Dept: PRIMARY CARE | Facility: CLINIC | Age: 55
End: 2025-07-17
Payer: COMMERCIAL

## 2025-07-17 ENCOUNTER — APPOINTMENT (OUTPATIENT)
Dept: PRIMARY CARE | Facility: CLINIC | Age: 55
End: 2025-07-17
Payer: COMMERCIAL

## 2025-07-17 VITALS
WEIGHT: 263.4 LBS | SYSTOLIC BLOOD PRESSURE: 122 MMHG | DIASTOLIC BLOOD PRESSURE: 72 MMHG | HEART RATE: 93 BPM | BODY MASS INDEX: 41.34 KG/M2 | HEIGHT: 67 IN

## 2025-07-17 DIAGNOSIS — G89.29 CHRONIC PAIN OF LEFT KNEE: ICD-10-CM

## 2025-07-17 DIAGNOSIS — M54.50 ACUTE RIGHT-SIDED LOW BACK PAIN WITHOUT SCIATICA: ICD-10-CM

## 2025-07-17 DIAGNOSIS — M54.2 NECK PAIN: ICD-10-CM

## 2025-07-17 DIAGNOSIS — F51.01 PRIMARY INSOMNIA: ICD-10-CM

## 2025-07-17 DIAGNOSIS — M25.562 CHRONIC PAIN OF LEFT KNEE: ICD-10-CM

## 2025-07-17 DIAGNOSIS — F43.12 CHRONIC POST-TRAUMATIC STRESS DISORDER: Primary | ICD-10-CM

## 2025-07-17 DIAGNOSIS — F41.8 ANXIETY ASSOCIATED WITH DEPRESSION: ICD-10-CM

## 2025-07-17 PROCEDURE — 3074F SYST BP LT 130 MM HG: CPT | Performed by: STUDENT IN AN ORGANIZED HEALTH CARE EDUCATION/TRAINING PROGRAM

## 2025-07-17 PROCEDURE — 3078F DIAST BP <80 MM HG: CPT | Performed by: STUDENT IN AN ORGANIZED HEALTH CARE EDUCATION/TRAINING PROGRAM

## 2025-07-17 PROCEDURE — 1036F TOBACCO NON-USER: CPT | Performed by: STUDENT IN AN ORGANIZED HEALTH CARE EDUCATION/TRAINING PROGRAM

## 2025-07-17 PROCEDURE — 99214 OFFICE O/P EST MOD 30 MIN: CPT | Performed by: STUDENT IN AN ORGANIZED HEALTH CARE EDUCATION/TRAINING PROGRAM

## 2025-07-17 PROCEDURE — 3008F BODY MASS INDEX DOCD: CPT | Performed by: STUDENT IN AN ORGANIZED HEALTH CARE EDUCATION/TRAINING PROGRAM

## 2025-07-17 RX ORDER — MELOXICAM 15 MG/1
15 TABLET ORAL DAILY
Qty: 90 TABLET | Refills: 1 | Status: SHIPPED | OUTPATIENT
Start: 2025-07-17 | End: 2026-01-13

## 2025-07-17 NOTE — PROGRESS NOTES
"Charo Linder is a 55 y.o. year old female presenting for No chief complaint on file.       HPI:  Patient states she is here for new patient visit.    Patient states she has OA. states she is constantly taking tylenol arthritis. States in her kness, right knee has 1x replacements, left has 2x replacement. Main issue is her left knee. Main issue is left knee, revision in 2023. Wants to be pain free. Endorses dietary changes and exercise for weight loss.     Sees Dr. Luo for cortisone shots in her wrist.    States she has issues with falling and staying asleep. Using THC gummies, stating she will not take them if she has work the next day. States that trazadone and Z-quil are not helping her. Endorses having TV on when sleeping,endorses 5-7 hours of sleep per night.     Endorses neck and back pain for which she was seen in the ED. The neck pain is from the base of her right occiput to her right shoulder. The back pain is in the lower left back. She states it is variable in presentation, and is not currently present.     Seeing a counselor that would like her to see psychiatrist      ROS:   All pertinent positive symptoms are included in history of present illness.    All other systems have been reviewed and are negative and noncontributory to this patient's current ailments.    Current Medications[1]    OBJECTIVE  Visit Vitals  /72 (BP Location: Right arm, Patient Position: Sitting, BP Cuff Size: Adult)   Pulse 93   Ht 1.702 m (5' 7\")   Wt 119 kg (263 lb 6.4 oz)   LMP 07/01/2020   BMI 41.25 kg/m²   OB Status Postmenopausal   Smoking Status Never   BSA 2.37 m²        Physical Exam:  GENERAL: Alert, oriented, pleasant, in no acute distress  HEENT: Head normocephalic, atraumatic  CV: Heart with regular rate and rhythm, normal S1/S2, systolic murmur 3/6 appreciated at 2 intercostal space bilaterally; normal peripheral pulses- radial and dorsalis pedis palpable  LUNGS: CTAB without wheezing, rhonchi or " rales; good respiratory effort, no increased work of breathing  ABDOMEN: soft, non-tender, non-distended, no masses appreciated; +BS in all four quadrants  EXTREMITIES: no edema, no cyanosis  MSK: firm and ropy texture in the upper trapezius bilaterally in the neck and back. No pain with palpation of left knee.   PSYCH: Appropriate mood and affect  SKIN: No rashes or lesions appreciated. Vertical scars present of bilateral knees.     Assessment/Plan   Diagnoses and all orders for this visit:  Chronic post-traumatic stress disorder  -     Referral to Psychiatry; Future  Anxiety associated with depression  -     Referral to Psychiatry; Future  Primary insomnia  -     Referral to Psychiatry; Future  Acute right-sided low back pain without sciatica  -     Referral to Physical Therapy; Future  Neck pain  -     Referral to Physical Therapy; Future  Chronic pain of left knee  -     Referral to Physical Therapy; Future        -Patient counseled on sleep hygiene. She is agreeable to trying to begin her night-time/bed-time routine earlier to ensure she is asleep with adequate time to get a full 8 hours of sleep each night. Likely there is a component of stress to issues with sleeping as well  -Continue with dietary and exercise changes to contribute with weight loss  -Patient is concerned with continued tylenol use for pain control due to adverse effects. Will avoid prescribing additional pain medication at this time.  -referral to psychiatrist for further treatment and evaluation of chronic psych conditions.  -Pain in neck and back are likely MSK in origin. Patient would benefit from additional stretching and strengthening of affected areas. Counseled her on stretches for neck targeting upper trapezius  -return to clinic in 6 months or otherwise as needed for follow up            [1]   Current Outpatient Medications   Medication Sig Dispense Refill    albuterol 90 mcg/actuation inhaler Inhale 2 puffs every 4 hours if needed  for wheezing. 6.7 g 1    buPROPion XL (Wellbutrin XL) 300 mg 24 hr tablet Take 1 tablet (300 mg) by mouth once daily in the morning. Do not crush, chew, or split. 90 tablet 1    escitalopram (Lexapro) 20 mg tablet Take 1 tablet (20 mg) by mouth once daily. 90 tablet 1    lidocaine (Lidoderm) 5 % patch Place 1 patch over 12 hours on the skin once daily. Remove & discard patch within 12 hours or as directed by MD. 10 patch 0    omeprazole (PriLOSEC) 20 mg DR capsule Take 1 capsule (20 mg) by mouth once daily. 90 capsule 1    traZODone (Desyrel) 150 mg tablet Take 1 tablet (150 mg) by mouth as needed at bedtime for sleep. 30 tablet 1    triamcinolone (Kenalog) 0.1 % cream Apply 1 Application topically 2 times a day.      cyclobenzaprine (Flexeril) 10 mg tablet Take 1 tablet (10 mg) by mouth 3 times a day as needed for muscle spasms for up to 5 days. 15 tablet 0     No current facility-administered medications for this visit.

## 2025-10-30 ENCOUNTER — APPOINTMENT (OUTPATIENT)
Dept: PRIMARY CARE | Facility: CLINIC | Age: 55
End: 2025-10-30
Payer: COMMERCIAL